# Patient Record
Sex: MALE | Race: WHITE | NOT HISPANIC OR LATINO | Employment: OTHER | URBAN - NONMETROPOLITAN AREA
[De-identification: names, ages, dates, MRNs, and addresses within clinical notes are randomized per-mention and may not be internally consistent; named-entity substitution may affect disease eponyms.]

---

## 2018-03-30 ENCOUNTER — OFFICE VISIT (OUTPATIENT)
Dept: FAMILY MEDICINE CLINIC | Facility: CLINIC | Age: 63
End: 2018-03-30

## 2018-03-30 VITALS
SYSTOLIC BLOOD PRESSURE: 190 MMHG | HEIGHT: 72 IN | DIASTOLIC BLOOD PRESSURE: 89 MMHG | WEIGHT: 208 LBS | OXYGEN SATURATION: 97 % | BODY MASS INDEX: 28.17 KG/M2 | HEART RATE: 100 BPM

## 2018-03-30 DIAGNOSIS — L91.8 SKIN TAG: ICD-10-CM

## 2018-03-30 DIAGNOSIS — Z13.220 ENCOUNTER FOR LIPID SCREENING FOR CARDIOVASCULAR DISEASE: ICD-10-CM

## 2018-03-30 DIAGNOSIS — Z12.5 ENCOUNTER FOR SCREENING FOR MALIGNANT NEOPLASM OF PROSTATE: ICD-10-CM

## 2018-03-30 DIAGNOSIS — Z12.5 ENCOUNTER FOR SPECIAL SCREENING EXAMINATION FOR NEOPLASM OF PROSTATE: ICD-10-CM

## 2018-03-30 DIAGNOSIS — M54.2 CERVICALGIA: Primary | ICD-10-CM

## 2018-03-30 DIAGNOSIS — Z13.6 ENCOUNTER FOR LIPID SCREENING FOR CARDIOVASCULAR DISEASE: ICD-10-CM

## 2018-03-30 PROCEDURE — 99204 OFFICE O/P NEW MOD 45 MIN: CPT | Performed by: FAMILY MEDICINE

## 2018-03-30 RX ORDER — LISINOPRIL 20 MG/1
20 TABLET ORAL DAILY
Refills: 2 | COMMUNITY
Start: 2018-02-27

## 2018-03-30 RX ORDER — HYDROCHLOROTHIAZIDE 12.5 MG/1
12.5 TABLET ORAL DAILY
Refills: 2 | COMMUNITY
Start: 2018-02-27 | End: 2018-04-27 | Stop reason: DRUGHIGH

## 2018-03-30 RX ORDER — CHLORAL HYDRATE 500 MG
1000 CAPSULE ORAL
Status: ON HOLD | COMMUNITY
End: 2021-12-15

## 2018-03-30 RX ORDER — MELATONIN
1000 DAILY
COMMUNITY

## 2018-04-02 ENCOUNTER — HOSPITAL ENCOUNTER (OUTPATIENT)
Dept: GENERAL RADIOLOGY | Facility: HOSPITAL | Age: 63
Discharge: HOME | End: 2018-04-02
Admitting: FAMILY MEDICINE

## 2018-04-02 PROCEDURE — 72040 X-RAY EXAM NECK SPINE 2-3 VW: CPT

## 2018-04-02 PROCEDURE — 72040 X-RAY EXAM NECK SPINE 2-3 VW: CPT | Performed by: RADIOLOGY

## 2018-04-03 LAB
ALBUMIN SERPL-MCNC: 3.9 G/DL (ref 3.4–4.8)
ALBUMIN/GLOB SERPL: 1.3 G/DL (ref 1.5–2.5)
ALP SERPL-CCNC: 72 U/L (ref 40–129)
ALT SERPL W P-5'-P-CCNC: 22 U/L (ref 10–44)
ANION GAP SERPL CALCULATED.3IONS-SCNC: 4.2 MMOL/L (ref 3.6–11.2)
AST SERPL-CCNC: 24 U/L (ref 10–34)
BASOPHILS # BLD AUTO: 0.02 10*3/MM3 (ref 0–0.3)
BASOPHILS NFR BLD AUTO: 0.3 % (ref 0–2)
BILIRUB SERPL-MCNC: 0.6 MG/DL (ref 0.2–1.8)
BUN BLD-MCNC: 15 MG/DL (ref 7–21)
BUN/CREAT SERPL: 17.4 (ref 7–25)
CALCIUM SPEC-SCNC: 9 MG/DL (ref 7.7–10)
CHLORIDE SERPL-SCNC: 113 MMOL/L (ref 99–112)
CHOLEST SERPL-MCNC: 169 MG/DL (ref 0–200)
CO2 SERPL-SCNC: 25.8 MMOL/L (ref 24.3–31.9)
CREAT BLD-MCNC: 0.86 MG/DL (ref 0.43–1.29)
DEPRECATED RDW RBC AUTO: 46 FL (ref 37–54)
EOSINOPHIL # BLD AUTO: 0.12 10*3/MM3 (ref 0–0.7)
EOSINOPHIL NFR BLD AUTO: 2 % (ref 0–5)
ERYTHROCYTE [DISTWIDTH] IN BLOOD BY AUTOMATED COUNT: 12.7 % (ref 11.5–14.5)
GFR SERPL CREATININE-BSD FRML MDRD: 90 ML/MIN/1.73
GLOBULIN UR ELPH-MCNC: 3.1 GM/DL
GLUCOSE BLD-MCNC: 85 MG/DL (ref 70–110)
HCT VFR BLD AUTO: 48.7 % (ref 42–52)
HDLC SERPL-MCNC: 42 MG/DL (ref 60–100)
HGB BLD-MCNC: 17.1 G/DL (ref 14–18)
IMM GRANULOCYTES # BLD: 0.02 10*3/MM3 (ref 0–0.03)
IMM GRANULOCYTES NFR BLD: 0.3 % (ref 0–0.5)
LDLC SERPL CALC-MCNC: 96 MG/DL (ref 0–100)
LDLC/HDLC SERPL: 2.29 {RATIO}
LYMPHOCYTES # BLD AUTO: 2.02 10*3/MM3 (ref 1–3)
LYMPHOCYTES NFR BLD AUTO: 33.9 % (ref 21–51)
MCH RBC QN AUTO: 34.8 PG (ref 27–33)
MCHC RBC AUTO-ENTMCNC: 35.1 G/DL (ref 33–37)
MCV RBC AUTO: 99.2 FL (ref 80–94)
MONOCYTES # BLD AUTO: 0.39 10*3/MM3 (ref 0.1–0.9)
MONOCYTES NFR BLD AUTO: 6.5 % (ref 0–10)
NEUTROPHILS # BLD AUTO: 3.39 10*3/MM3 (ref 1.4–6.5)
NEUTROPHILS NFR BLD AUTO: 57 % (ref 30–70)
OSMOLALITY SERPL CALC.SUM OF ELEC: 285.1 MOSM/KG (ref 273–305)
PLATELET # BLD AUTO: 201 10*3/MM3 (ref 130–400)
PMV BLD AUTO: 12.2 FL (ref 6–10)
POTASSIUM BLD-SCNC: 3.5 MMOL/L (ref 3.5–5.3)
PROT SERPL-MCNC: 7 G/DL (ref 6–8)
PSA SERPL-MCNC: 3.54 NG/ML (ref 0–4)
RBC # BLD AUTO: 4.91 10*6/MM3 (ref 4.7–6.1)
SODIUM BLD-SCNC: 143 MMOL/L (ref 135–153)
TRIGL SERPL-MCNC: 155 MG/DL (ref 0–150)
VLDLC SERPL-MCNC: 31 MG/DL
WBC NRBC COR # BLD: 5.96 10*3/MM3 (ref 4.5–12.5)

## 2018-04-03 PROCEDURE — 84153 ASSAY OF PSA TOTAL: CPT | Performed by: FAMILY MEDICINE

## 2018-04-03 PROCEDURE — 36415 COLL VENOUS BLD VENIPUNCTURE: CPT | Performed by: FAMILY MEDICINE

## 2018-04-03 PROCEDURE — 85025 COMPLETE CBC W/AUTO DIFF WBC: CPT | Performed by: FAMILY MEDICINE

## 2018-04-03 PROCEDURE — 80053 COMPREHEN METABOLIC PANEL: CPT | Performed by: FAMILY MEDICINE

## 2018-04-03 PROCEDURE — 80061 LIPID PANEL: CPT | Performed by: FAMILY MEDICINE

## 2018-04-04 ENCOUNTER — TELEPHONE (OUTPATIENT)
Dept: FAMILY MEDICINE CLINIC | Facility: CLINIC | Age: 63
End: 2018-04-04

## 2018-04-04 DIAGNOSIS — M50.90 CERVICAL NECK PAIN WITH EVIDENCE OF DISC DISEASE: Primary | ICD-10-CM

## 2018-04-04 NOTE — TELEPHONE ENCOUNTER
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 This also limits the movement ability he has with his neck. I would recommend physical therapy to see if we can make the muscles around the bones stronger. We can also wait and discuss the xray at his next appointment later this month. Thanks.    Patient notified & wishes to be referred to PT.

## 2018-04-04 NOTE — TELEPHONE ENCOUNTER
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 This also limits the movement ability he has with his neck. I would recommend physical therapy to see if we can make the muscles around the bones stronger. We can also wait and discuss the xray at his next appointment later this month. Thanks.

## 2018-04-04 NOTE — TELEPHONE ENCOUNTER
----- Message from Barb Gunderson MD sent at 4/4/2018  6:22 AM EDT -----  Labs stable. Okay to either call or send letter to patient. Thanks.      Spoke with patient & he verbalized understanding,wanted to know about his X-Rays?

## 2018-04-13 ENCOUNTER — HOSPITAL ENCOUNTER (OUTPATIENT)
Dept: PHYSICAL THERAPY | Facility: HOSPITAL | Age: 63
Setting detail: THERAPIES SERIES
Discharge: HOME | End: 2018-04-13

## 2018-04-13 DIAGNOSIS — M54.2 CERVICAL PAIN: Primary | ICD-10-CM

## 2018-04-13 PROCEDURE — G8978 MOBILITY CURRENT STATUS: HCPCS

## 2018-04-13 PROCEDURE — G8979 MOBILITY GOAL STATUS: HCPCS

## 2018-04-13 PROCEDURE — 97161 PT EVAL LOW COMPLEX 20 MIN: CPT | Performed by: PHYSICAL THERAPIST

## 2018-04-13 NOTE — THERAPY EVALUATION
"    Outpatient Physical Therapy Ortho Initial Evaluation   [facility]     Patient Name: David Frias  : 1955  MRN: [id number]  Today's Date: 2018      Visit Date: 2018    Patient Active Problem List   Diagnosis   • Abdominal pain   • Choledocholithiasis with obstruction        Past Medical History:   Diagnosis Date   • Hypertension     but refuses to take medication as he had a drop in blood pressure once        Past Surgical History:   Procedure Laterality Date   • CHOLECYSTECTOMY     • CHOLECYSTECTOMY WITH INTRAOPERATIVE CHOLANGIOGRAM N/A 8/3/2016    Procedure: CHOLECYSTECTOMY LAPAROSCOPIC INTRAOPERATIVE CHOLANGIOGRAM;  Surgeon: Garrett Barnes MD;  Location: [facility];  Service:    • FRACTURE SURGERY      ORIF RIGHT LEG    • HAND SURGERY Right     SECOND DIGIT AT FIRST AND SECOND JOINT SURGICAL REPAIR RELATED TO INJURY   • LEG SURGERY         Visit Dx:     ICD-10-CM ICD-9-CM   1. Cervical pain M54.2 723.1             Patient History     Row Name 18 0900             History    Chief Complaint Pain  -AD      Type of Pain Neck pain  -AD      Date Current Problem(s) Began --   Summer 2017  -      Brief Description of Current Complaint The patient reported \"flipping my riding lawnmower\" the summer of 2017. He stated when the lawnmower flipped the steering wheel hit him at about eye/nose level, resulting in his head slamming against the ground. He stated he felt \"weird after\" and reported to an MD which did not perform imaging. He stated he began seeing a new MD approximately a week ago, with an X-ray performed. He stated the x-ray revealed no fractures. He stated approximately two and a half months ago he moved \"something heavy\" and experienced a sharp pain in the back of his head with \"numbnes of both arms\". He stated no imaging was performed following the incident. The patient stated the pain is intermittent, with shooting pain resulting with rotation. He reported hot showers have helped " "with pain relief. The patient stated he experiences a \"pressure\" around his head, but not as intense as a headache. He stated he does not experience dizziness or nausea as a result of head and neck pain.  -AD      Current Tobacco Use None  -AD      Smoking Status Former smoker  -AD      Patient's Rating of General Health Good  -AD      Hand Dominance right-handed  -AD         Pain     Pain Location Neck  -AD      Pain at Present 2  -AD      Pain at Best 0  -AD      Pain at Worst 10  -AD      Pain Frequency Intermittent  -AD      Pain Description Aching;Pressure  -AD      What Performance Factors Make the Current Problem(s) WORSE? Quick, frequent movements.  -AD      What Performance Factors Make the Current Problem(s) BETTER? Hot showers  -AD      Is your sleep disturbed? Yes  -AD      Is medication used to assist with sleep? No  -AD      Difficulties with ADL's? Pain with daily activities, requires extra time.  -AD         Fall Risk Assessment    Any falls in the past year: No  -AD         Daily Activities    Primary Language English  -AD      How does patient learn best? Listening;Demonstration  -AD      Pt Participated in POC and Goals Yes  -AD         Safety    Are you being hurt, hit, or frightened by anyone at home or in your life? No  -AD      Are you being neglected by a caregiver No  -AD        User Key  (r) = Recorded By, (t) = Taken By, (c) = Cosigned By    Initials Name Provider Type    AD Ashley Claudene Dalton, PT Physical Therapist                PT Ortho     Row Name 04/13/18 1000       Subjective Pain    Able to rate subjective pain? yes  -AD    Pre-Treatment Pain Level 2  -AD    Post-Treatment Pain Level 2  -AD       Posture/Observations    Posture- WNL --   Forward head  -AD       DTR- Upper Quarter Clearing    Biceps (C5/6) Bilateral:;2- Normal response  -AD    Brachioradialis (C6) Bilateral:;2- Normal response  -AD    Triceps (C7) Bilateral:;2- Normal response  -AD       Neural Tension Signs- " Upper Quarter Clearing    ULNTT 1 Bilateral:;Negative  -AD    ULNTT 3 Bilateral:;Negative  -AD    ULNTT 4 Bilateral:;Negative  -AD       Sensory Screen for Light Touch- Upper Quarter Clearing    C4 (posterior shoulder) Bilateral:;Intact  -AD    C5 (lateral upper arm) Bilateral:;Intact  -AD    C6 (tip of thumb) Bilateral:;Intact  -AD    C7 (tip of 3rd finger) Bilateral:;Intact  -AD    C8 (tip of 5th finger) Bilateral:;Intact  -AD    T1 (medial lower arm) Bilateral:;Intact  -AD       Myotomal Screen- Upper Quarter Clearing    Shoulder flexion (C5) Bilateral:;5 (Normal)  -AD    Elbow flexion/wrist extension (C6) Bilateral:;5 (Normal)  -AD    Elbow extension/wrist flexion (C7) Bilateral:;5 (Normal)  -AD       Cervical/Shoulder ROM Screen    Cervical flexion Impaired   30  -AD    Cervical extension Impaired   32  -AD    Cervical lateral flexion Impaired   Right: 10; Left: 8  -AD    Cervical rotation Impaired   Right: 44; Left: 42  -AD       Cervical Palpation    Cervical Palpation- Location? --   C4 spinous process and right TP tenderness.  -AD       Cervical/Thoracic Special Tests    Spurlings (Foraminal Compression) Bilateral:;Negative  -AD    Cervical Compression (Forarminal Compression vs. Facet Pain) Negative  -AD    Cervical Distraction (Foraminal Compression vs. Facet Pain) Negative  -AD    Transverse Ligament (Instability) Negative  -AD        Strength Right    # Reps 3  -AD    Right Rung 2  -AD    Right  Test 1 100  -AD    Right  Test 2 100  -AD    Right  Test 3 100  -AD     Strength Average Right 100  -AD        Strength Left    # Reps 3  -AD    Left Rung 2  -AD    Left  Test 1 95  -AD    Left  Test 2 110  -AD    Left  Test 3 110  -AD     Strength Average Left 105  -AD       Sensation    Sensation WNL? WNL  -AD      User Key  (r) = Recorded By, (t) = Taken By, (c) = Cosigned By    Initials Name Provider Type    AD Ashley Claudene Dalton, PT Physical Therapist                       Therapy Education  Given: HEP, Pain management, Posture/body mechanics  Program: New  How Provided: Verbal, Demonstration  Provided to: Patient  Level of Understanding: Teach back education performed, Verbalized, Demonstrated           PT OP Goals     Row Name 04/13/18 1000          PT Short Term Goals    STG Date to Achieve 04/27/18  -AD     STG 1 Pt will be instructed in HEP for improved independence.  -AD     STG 1 Progress New  -AD     STG 2 Pt will demonstrate a 5 degree improvement in CROM for improved mobility and function.  -AD     STG 2 Progress New  -AD     STG 3 Pt will report a maximum of 7/10 cervical pain with daily activities.  -AD     STG 3 Progress New  -AD        Long Term Goals    LTG Date to Achieve 05/13/18  -AD     LTG 1 Pt will demonstrate CROM within normal limits for improved mobility and function.  -AD     LTG 1 Progress New  -AD     LTG 2 Pt will report a maximum of 4/10 cervical pain with daily activities.  -AD     LTG 2 Progress New  -AD     LTG 3 Pt will report less than 20% impairment on the NDI for improved functional independence.  -AD     LTG 3 Progress New  -AD     LTG 4 Pt will report the ability to sleep with no interruptions due to cervical pain for improved QOL.  -AD     LTG 4 Progress New  -AD        Time Calculation    PT Goal Re-Cert Due Date 05/13/18  -AD       User Key  (r) = Recorded By, (t) = Taken By, (c) = Cosigned By    Initials Name Provider Type    AD Ashley Claudene Dalton, PT Physical Therapist                PT Assessment/Plan     Row Name 04/13/18 1104          PT Assessment    Functional Limitations Limitation in home management;Limitations in community activities;Performance in sport activities;Performance in self-care ADL;Performance in leisure activities  -AD     Impairments Range of motion;Muscle strength;Pain;Joint mobility;Joint integrity;Posture  -AD     Assessment Comments The patient is a 62 year old male presenting to the clinic with  cervical pain. He demonstrated impaired range of motion and hypomobility of C4-C7 spinous processes. In addition, he was tender to palpation of C4 spinous and transverse processes. Special tests for neural involvement were negative, with finding revealing hypomobility and muscle guarding as likely contributors to pain. He would benefit from skilled physical therapy to address functional limitations and impairments. The patient will be progressed as tolerated.   -AD     Please refer to paper survey for additional self-reported information Yes  -AD     Rehab Potential Good  -AD     Patient/caregiver participated in establishment of treatment plan and goals Yes  -AD     Patient would benefit from skilled therapy intervention Yes  -AD        PT Plan    PT Frequency 2x/week   4 weeks  -AD     Planned CPT's? PT EVAL MOD COMPLELITY: 38151;PT RE-EVAL: 78254;PT NEUROMUSC RE-EDUCATION EA 15 MIN: 71093;PT MANUAL THERAPY EA 15 MIN: 86998;PT THER ACT EA 15 MIN: 54671;PT THER PROC EA 15 MIN: 16108;PT SELF CARE/HOME MGMT/TRAIN EA 15: 62256;PT ULTRASOUND EA 15 MIN: 17186;PT ELECTRICAL STIM ATTD EA 15 MIN: 06655;PT ELECTRICAL STIM UNATTEND: ;PT THER SUPP EA 15 MIN;PT HOT/COLD PACK WC NONMCARE: 04162;PT TRACTION CERVICAL: 64313  -AD     PT Plan Comments Progress as tolerated per POC.  -AD       User Key  (r) = Recorded By, (t) = Taken By, (c) = Cosigned By    Initials Name Provider Type    AD Ashley Claudene Dalton, PT Physical Therapist                  Exercises     Row Name 04/13/18 1000             Subjective Pain    Able to rate subjective pain? yes  -AD      Pre-Treatment Pain Level 2  -AD      Post-Treatment Pain Level 2  -AD         Exercise 1    Exercise Name 1 HEP: Scapular squeeze, cervical retraction, UT stretch, levator stretch  -AD      Cueing 1 Verbal;Tactile;Demo  -AD        User Key  (r) = Recorded By, (t) = Taken By, (c) = Cosigned By    Initials Name Provider Type    AD Ashley Claudene Dalton, PT Physical  Therapist                        Outcome Measure Options: Neck Disability Index (NDI)  Neck Disability Index  Section 1 - Pain Intensity: The pain is very mild at the moment.  Section 2 - Personal Care: I can look after myself normally without causing extra pain.  Section 3 - Lifting: Pain prevents me from lifting heavy weights off the floor but I can manage if items are conveniently positioned, ie. on a table.  Section 4 - Work: I can't do my usual work.  Section 5 - Headaches: I have slight headaches that come infrequently.  Section 6 - Concentration: I can concentrate fully without difficulty.  Section 7 - Sleeping: My sleep is mildly disturbed for up to 1-2 hours.  Section 8 - Driving: I can drive as long as I want with slight neck pain.  Section 9 - Reading: I can't read at all.  Section 10 - Recreation: I have some neck pain with a few recreational activities.  Neck Disability Index Score: 17  Neck Disability Index Comments: 17/50 = 34% impairment      Time Calculation:   Start Time: 0956  Stop Time: 1100  Time Calculation (min): 64 min     Therapy Charges for Today     Code Description Service Date Service Provider Modifiers Qty    18505489064 HC PT EVAL LOW COMPLEXITY 4 4/13/2018 Ashley Claudene Dalton, PT GP 1          PT G-Codes  Outcome Measure Options: Neck Disability Index (NDI)         Ashley Claudene Dalton, PT  4/13/2018

## 2018-04-13 NOTE — PROGRESS NOTES
"David Frias     VITALS: Blood pressure (!) 190/89, pulse 100, height 182.9 cm (72.01\"), weight 94.3 kg (208 lb), SpO2 97 %.    Subjective  Chief Complaint:   Chief Complaint   Patient presents with   • Neck Pain   • Headache        History of Present Illness:  Patient is a 62 y.o.  male with a medical history significant for hypertension and neck pain who presents to clinic secondary to establishment of care.He complains today of neck pain and a headache.    Patient has hypertension and is on HCTZ 12.5 mg daily, Lisinopril 20 mg daily and hydralazine 25 mg orally TID. Denies any side effects of the medications. Denies any dizziness, lightheadedness, blurry vision, chest pain, or edema.   Home blood pressure: No  Low salt diet: No    The following portions of the patient's history were reviewed and updated as appropriate: allergies, current medications, past family history, past medical history, past social history, past surgical history and problem list.    Past Medical History  Past Medical History:   Diagnosis Date   • Hypertension     but refuses to take medication as he had a drop in blood pressure once       Review of Systems  Constitutional: Denies any recent history of  fevers, chills, itching.  Eyes: Denies any changes in vision. Denies any blurry vision or diplopia.  Ears, Nose, Mouth, Throat: Denies any sore throat, rhinorrhea, or cough.  Cardiovascular: Denies any chest pain, pressure, or palpitations.  Respiratory: Denies any shortness of breath or wheezing.  Gastrointestinal: Denies any abdominal pain, nausea, vomiting, diarrhea, or constipation.  Genitourinary: Denies any changes in urination.  Skin and/or breasts: Denies any rashes.  Neurological: Denies any changes in balance or gait.  Psychiatric: Denies any anxiety, depression, or insomnia. Denies any suicidal or homicidal ideations.  Endocrine: Denies any heat or cold intolerance. Denies any voice changes, polydipsia, or " polyuria.  Hematologic/Lymphatic: Denies any anemia or easy bruising.    Surgical History  Past Surgical History:   Procedure Laterality Date   • CHOLECYSTECTOMY     • CHOLECYSTECTOMY WITH INTRAOPERATIVE CHOLANGIOGRAM N/A 8/3/2016    Procedure: CHOLECYSTECTOMY LAPAROSCOPIC INTRAOPERATIVE CHOLANGIOGRAM;  Surgeon: Garrett Barnes MD;  Location: [facility];  Service:    • FRACTURE SURGERY  2015    ORIF RIGHT LEG    • HAND SURGERY Right     SECOND DIGIT AT FIRST AND SECOND JOINT SURGICAL REPAIR RELATED TO INJURY   • LEG SURGERY         Family History  Family History   Problem Relation Age of Onset   • Heart disease Paternal Grandmother    • Hypertension Sister    • Cancer Sister    • Depression Brother        Social History  Social History     Social History   • Marital status:      Spouse name: N/A   • Number of children: N/A   • Years of education: N/A     Occupational History   • Not on file.     Social History Main Topics   • Smoking status: Former Smoker     Packs/day: 1.00     Years: 15.00     Types: Cigarettes   • Smokeless tobacco: Never Used      Comment: STOPPED SMOKING 10-12 YEARS AGO   • Alcohol use No   • Drug use: No   • Sexual activity: Defer     Other Topics Concern   • Not on file     Social History Narrative   • No narrative on file       Objective  Physical Exam  Gen: Patient in NAD. Pleasant and answers appropriately. A&Ox3.    Skin: Warm and dry with normal turgor. No purpura, rashes, or unusual pigmentation noted. Hair is normal in appearance and distribution.    HEENT: NC/AT. No lesions noted. Conjunctiva clear, sclera nonicteric. PERRL. EOMI without nystagmus or strabismus. Fundi appear benign. No hemorrhages or exudates of eyes. Auditory canals are patent bilaterally without lesions. TMs intact, nonerythematous, nonbulging without lesions. Nasal mucosa pink, nonerythematous, and nonedematous. No nasal polyps or other lesions noted. Frontal and maxillary sinuses are nontender. Lips  acyanotic without pallor. Teeth appear to be in good condition. +/- dental caries. There are no lesions or tremor of the tongue. O/P nonerythematous and moist without exudate.    Neck: Supple without lymph nodes palpated. FROM. No evidence of tracheal deviation or thyromegaly. Carotid pulses 2+/4 B/L without bruits. J    Lungs: CTA B/L without rales, rhonchi, crackles, or wheezes.    Heart: RRR. S1 and S2 normal. No S3 or S4. No MRGT.    Abd: Soft, nontender,nondistended. (+)BSx4 quadrants. No scars or abnormalities noted. No HSM, masses, or bruits noted.    Extrem: No CCE. Radial and dorsalis pedis pulses 2+/4 and equal B/L. FROMx4. No bone, joint, or muscle tenderness noted.    Neuro: CNs II-XII grossly intact. Speech, memory, and expression WNL. Muscle strength 5/5. DTRs 2+/4 and equal in both UE and LE B/L. No muscle atrophy or involuntary movement noted. Cerebellar function is intact. No focal motor/sensory deficits.    Procedures    Assessment/Plan  David Frias is a 62 y.o. here for establishment of care.  Diagnoses and all orders for this visit:    Cervicalgia  -     XR Spine Cervical 2 View  -     Comprehensive Metabolic Panel  -     CBC & Differential  -     CBC Auto Differential  -     Osmolality, Calculated; Future  -     Osmolality, Calculated    Encounter for lipid screening for cardiovascular disease  -     Lipid Panel    Encounter for special screening examination for neoplasm of prostate    Encounter for screening for malignant neoplasm of prostate  -     PSA DIAGNOSTIC    Skin tag  -     Ambulatory Referral to Ophthalmology      Findings and plans discussed with patient who verbalizes understanding and agreement. Will followup with patient once results are in. Patient to followup at clinic PRN or in one month for medical followup.      Barb Gunderson MD

## 2018-04-17 ENCOUNTER — HOSPITAL ENCOUNTER (OUTPATIENT)
Dept: PHYSICAL THERAPY | Facility: HOSPITAL | Age: 63
Setting detail: THERAPIES SERIES
Discharge: HOME | End: 2018-04-17

## 2018-04-17 DIAGNOSIS — M54.2 CERVICAL PAIN: Primary | ICD-10-CM

## 2018-04-17 PROCEDURE — 97140 MANUAL THERAPY 1/> REGIONS: CPT

## 2018-04-17 PROCEDURE — 97110 THERAPEUTIC EXERCISES: CPT

## 2018-04-17 NOTE — THERAPY TREATMENT NOTE
Outpatient Physical Therapy Ortho Treatment Note   [place]     Patient Name: David Frias  : 1955  MRN: [id number]  Today's Date: 2018      Visit Date: 2018    Visit Dx:    ICD-10-CM ICD-9-CM   1. Cervical pain M54.2 723.1       Patient Active Problem List   Diagnosis   • Abdominal pain   • Choledocholithiasis with obstruction        Past Medical History:   Diagnosis Date   • Hypertension     but refuses to take medication as he had a drop in blood pressure once        Past Surgical History:   Procedure Laterality Date   • CHOLECYSTECTOMY     • CHOLECYSTECTOMY WITH INTRAOPERATIVE CHOLANGIOGRAM N/A 8/3/2016    Procedure: CHOLECYSTECTOMY LAPAROSCOPIC INTRAOPERATIVE CHOLANGIOGRAM;  Surgeon: Garrett Barnes MD;  [facility];  Service:    • FRACTURE SURGERY      ORIF RIGHT LEG    • HAND SURGERY Right     SECOND DIGIT AT FIRST AND SECOND JOINT SURGICAL REPAIR RELATED TO INJURY   • LEG SURGERY                               PT Assessment/Plan     Row Name 18 1108          PT Assessment    Assessment Comments Tx today consisted of of mh and estim to cervical region followed by ther ex; stm and joint mobs.  Pt responded well with cues for cervical retractions and holding stretches.  Pt reported decreased pain following session.  -RT        PT Plan    PT Plan Comments Will follow progressing as pt tolerates.  -RT       User Key  (r) = Recorded By, (t) = Taken By, (c) = Cosigned By    Initials Name Provider Type    RT Gary Starr, PT Physical Therapist                Modalities     Row Name 18 0900             Subjective Comments    Subjective Comments Pt reports having increased cervical tightness.  -RT         Subjective Pain    Able to rate subjective pain? yes  -RT      Pre-Treatment Pain Level 3  -RT      Post-Treatment Pain Level 1  -RT         Moist Heat    MH Applied Yes  -RT      Location cervical region  -RT      Rx Minutes 10 mins  -RT         ELECTRICAL STIMULATION     Attended/Unattended Unattended  -RT      Stimulation Type IFC  -RT      Location/Electrode Placement/Other cervical region  -RT      Rx Minutes 10 mins  -RT        User Key  (r) = Recorded By, (t) = Taken By, (c) = Cosigned By    Initials Name Provider Type    RT Gary Starr PT Physical Therapist                Exercises     Row Name 04/17/18 0900             Subjective Comments    Subjective Comments Pt reports having increased cervical tightness.  -RT         Subjective Pain    Able to rate subjective pain? yes  -RT      Pre-Treatment Pain Level 3  -RT      Post-Treatment Pain Level 1  -RT         Exercise 1    Exercise Name 1 cervical retraction; crom 15, scap squeezes, ut stretch, lev scap stretch  -RT      Cueing 1 Verbal;Tactile;Demo  -RT      Time 1 20min   -RT        User Key  (r) = Recorded By, (t) = Taken By, (c) = Cosigned By    Initials Name Provider Type    RT Gary Starr PT Physical Therapist                        Manual Rx (last 36 hours)      Manual Treatments     Row Name 04/17/18 1100             Manual Rx 1    Manual Rx 1 Location cervical and bilat UT  -RT      Manual Rx 1 Type stm and PA and lateral glides to c-spine  -RT      Manual Rx 1 Grade 2  -RT      Manual Rx 1 Duration 20  -RT        User Key  (r) = Recorded By, (t) = Taken By, (c) = Cosigned By    Initials Name Provider Type    RT Gary Starr PT Physical Therapist              Therapy Education  Given: HEP, Pain management, Posture/body mechanics  Program: New  How Provided: Verbal, Demonstration  Provided to: Patient  Level of Understanding: Teach back education performed, Verbalized, Demonstrated              Time Calculation:   Start Time: 0900  Stop Time: 0958  Time Calculation (min): 58 min    Therapy Charges for Today     Code Description Service Date Service Provider Modifiers Qty    65138512912  PT THER PROC EA 15 MIN 4/17/2018 Gary Starr, PT GP 1    [id number]  PT MANUAL THERAPY EA 15 MIN 4/17/2018  Gary Starr, PT GP 2                    Gary Starr, PT  4/17/2018

## 2018-04-19 ENCOUNTER — HOSPITAL ENCOUNTER (OUTPATIENT)
Dept: PHYSICAL THERAPY | Facility: HOSPITAL | Age: 63
Setting detail: THERAPIES SERIES
Discharge: HOME | End: 2018-04-19

## 2018-04-19 DIAGNOSIS — M54.2 CERVICAL PAIN: Primary | ICD-10-CM

## 2018-04-19 PROCEDURE — G0283 ELEC STIM OTHER THAN WOUND: HCPCS

## 2018-04-19 PROCEDURE — 97110 THERAPEUTIC EXERCISES: CPT

## 2018-04-19 PROCEDURE — 97140 MANUAL THERAPY 1/> REGIONS: CPT

## 2018-04-19 NOTE — THERAPY TREATMENT NOTE
Outpatient Physical Therapy Ortho Treatment Note   [place]     Patient Name: David Frias  : 1955  MRN: [id number]  Today's Date: 2018      Visit Date: 2018    Visit Dx:    ICD-10-CM ICD-9-CM   1. Cervical pain M54.2 723.1       Patient Active Problem List   Diagnosis   • Abdominal pain   • Choledocholithiasis with obstruction        Past Medical History:   Diagnosis Date   • Hypertension     but refuses to take medication as he had a drop in blood pressure once        Past Surgical History:   Procedure Laterality Date   • CHOLECYSTECTOMY     • CHOLECYSTECTOMY WITH INTRAOPERATIVE CHOLANGIOGRAM N/A 8/3/2016    Procedure: CHOLECYSTECTOMY LAPAROSCOPIC INTRAOPERATIVE CHOLANGIOGRAM;  Surgeon: Garrett Barnes MD;  Location: [facility];  Service:    • FRACTURE SURGERY      ORIF RIGHT LEG    • HAND SURGERY Right     SECOND DIGIT AT FIRST AND SECOND JOINT SURGICAL REPAIR RELATED TO INJURY   • LEG SURGERY                               PT Assessment/Plan     Row Name 18 1005          PT Assessment    Assessment Comments Tx today consisted of mh and estim to neck followed by ther ex and ended with stm.  Pt responded well to added mid rows with tb and pt reported no pain following session.  -RT        PT Plan    PT Plan Comments Will follow progressinng mobility and decreased pain.  -RT       User Key  (r) = Recorded By, (t) = Taken By, (c) = Cosigned By    Initials Name Provider Type    RT Gary Starr, PT Physical Therapist                    Exercises     Row Name 18 1000             Subjective Comments    Subjective Comments Pt reports therapy has helped with his neck pain.  -RT         Subjective Pain    Able to rate subjective pain? yes  -RT      Pre-Treatment Pain Level 3  -RT      Post-Treatment Pain Level 0  -RT         Exercise 1    Exercise Name 1 cervical retraction; crom 15, mid rows rtb 15, ut stretch, lev scap stretch  -RT      Cueing 1 Verbal;Tactile;Demo  -RT      Time 1  20min  -RT        User Key  (r) = Recorded By, (t) = Taken By, (c) = Cosigned By    Initials Name Provider Type    RT Gary Starr PT Physical Therapist                        Manual Rx (last 36 hours)      Manual Treatments     Row Name 04/19/18 0900             Manual Rx 1    Manual Rx 1 Location cervical and bilat UT  -RT      Manual Rx 1 Type stm and PA and lateral glides to c-spine  -RT      Manual Rx 1 Grade 2  -RT      Manual Rx 1 Duration 25  -RT        User Key  (r) = Recorded By, (t) = Taken By, (c) = Cosigned By    Initials Name Provider Type    RT Gary Starr PT Physical Therapist              Therapy Education  Given: HEP, Pain management, Posture/body mechanics  Program: Reinforced  How Provided: Verbal, Demonstration  Provided to: Patient  Level of Understanding: Teach back education performed, Verbalized, Demonstrated              Time Calculation:   Start Time: 0850  Stop Time: 0955  Time Calculation (min): 65 min    Therapy Charges for Today     Code Description Service Date Service Provider Modifiers Qty    01254058310 HC PT THER PROC EA 15 MIN 4/19/2018 Gary Starr, PT GP 1    47413998520 HC PT MANUAL THERAPY EA 15 MIN 4/19/2018 Gary Starr, PT GP 2    18403027711 HC PT ELECTRICAL STIM UNATTENDED 4/19/2018 Gary Starr, PT  1                    Gary Starr, PT  4/19/2018

## 2018-04-24 ENCOUNTER — HOSPITAL ENCOUNTER (OUTPATIENT)
Dept: PHYSICAL THERAPY | Facility: HOSPITAL | Age: 63
Setting detail: THERAPIES SERIES
Discharge: HOME | End: 2018-04-24

## 2018-04-24 DIAGNOSIS — M54.2 CERVICAL PAIN: Primary | ICD-10-CM

## 2018-04-24 PROCEDURE — 97140 MANUAL THERAPY 1/> REGIONS: CPT | Performed by: PHYSICAL THERAPIST

## 2018-04-24 PROCEDURE — 97110 THERAPEUTIC EXERCISES: CPT | Performed by: PHYSICAL THERAPIST

## 2018-04-24 PROCEDURE — G0283 ELEC STIM OTHER THAN WOUND: HCPCS | Performed by: PHYSICAL THERAPIST

## 2018-04-24 NOTE — THERAPY TREATMENT NOTE
Outpatient Physical Therapy Ortho Treatment Note   [place]     Patient Name: David rFias  : 1955  MRN: [id number]  Today's Date: 2018      Visit Date: 2018    Visit Dx:    ICD-10-CM ICD-9-CM   1. Cervical pain M54.2 723.1       Patient Active Problem List   Diagnosis   • Abdominal pain   • Choledocholithiasis with obstruction        Past Medical History:   Diagnosis Date   • Hypertension     but refuses to take medication as he had a drop in blood pressure once        Past Surgical History:   Procedure Laterality Date   • CHOLECYSTECTOMY     • CHOLECYSTECTOMY WITH INTRAOPERATIVE CHOLANGIOGRAM N/A 8/3/2016    Procedure: CHOLECYSTECTOMY LAPAROSCOPIC INTRAOPERATIVE CHOLANGIOGRAM;  Surgeon: Garrett Barnes MD;  Location: [facility];  Service:    • FRACTURE SURGERY      ORIF RIGHT LEG    • HAND SURGERY Right     SECOND DIGIT AT FIRST AND SECOND JOINT SURGICAL REPAIR RELATED TO INJURY   • LEG SURGERY                               PT Assessment/Plan     Row Name 18 1514 18 1513       PT Assessment    Assessment Comments  -- Pt seen today for e-stim to cervical spine with mh followed by STM and stretching as well as gentle mobs to improve CROM.  He performed ther ex to improve CROM and strength as well.  He kavitha session well.   -AT       PT Plan    PT Plan Comments cont poc  -AT  --      User Key  (r) = Recorded By, (t) = Taken By, (c) = Cosigned By    Initials Name Provider Type    AT Crystal Starr, PT Physical Therapist                Modalities     Row Name 18 1500             Subjective Comments    Subjective Comments Pt arrives and reports continued disomfort in neck and states that he is going back to dr soon to have skin tag removed off of eye as well as further testing of neck.  He states therapy is helping.   -AT         Subjective Pain    Able to rate subjective pain? yes  -AT      Pre-Treatment Pain Level 3  -AT      Post-Treatment Pain Level 1  -AT          Moist Heat    MH Applied Yes  -AT      Location cervical region  -AT      Rx Minutes 10 mins  -AT         ELECTRICAL STIMULATION    Attended/Unattended Unattended  -AT      Stimulation Type IFC  -AT      Location/Electrode Placement/Other cervical region  -AT      Rx Minutes 10 mins  -AT        User Key  (r) = Recorded By, (t) = Taken By, (c) = Cosigned By    Initials Name Provider Type    AT Crystal Starr PT Physical Therapist                Exercises     Row Name 04/24/18 1500             Subjective Comments    Subjective Comments Pt arrives and reports continued disomfort in neck and states that he is going back to dr soon to have skin tag removed off of eye as well as further testing of neck.  He states therapy is helping.   -AT         Subjective Pain    Able to rate subjective pain? yes  -AT      Pre-Treatment Pain Level 3  -AT      Post-Treatment Pain Level 1  -AT         Exercise 1    Exercise Name 1 cervical retraction; crom 15, mid rows rtb 15, ut stretch, lev scap stretch  -AT      Cueing 1 Verbal;Tactile;Demo  -AT      Time 1 20min  -AT        User Key  (r) = Recorded By, (t) = Taken By, (c) = Cosigned By    Initials Name Provider Type    AT Crystal Starr PT Physical Therapist                        Manual Rx (last 36 hours)      Manual Treatments     Row Name 04/24/18 1500             Manual Rx 1    Manual Rx 1 Location cervical and bilat UT  -AT      Manual Rx 1 Type STM and PA glides to tolerance  -AT      Manual Rx 1 Duration 15  -AT        User Key  (r) = Recorded By, (t) = Taken By, (c) = Cosigned By    Initials Name Provider Type    AT Crystal Starr PT Physical Therapist                             Time Calculation:   Start Time: 0900  Stop Time: 0945  Time Calculation (min): 45 min    Therapy Charges for Today     Code Description Service Date Service Provider Modifiers Qty    88775411611  PT MANUAL THERAPY EA 15 MIN 4/24/2018 Crystal Starr PT GP 1     74145452462 HC PT THER PROC EA 15 MIN 4/24/2018 Crystal Starr, PT GP 1    16361768123 HC PT ELECTRICAL STIM UNATTENDED 4/24/2018 Crystal Starr, PT  1                    Crystal Starr, PT  4/24/2018

## 2018-04-26 ENCOUNTER — HOSPITAL ENCOUNTER (OUTPATIENT)
Dept: PHYSICAL THERAPY | Facility: HOSPITAL | Age: 63
Setting detail: THERAPIES SERIES
Discharge: HOME | End: 2018-04-26

## 2018-04-26 DIAGNOSIS — M54.2 CERVICAL PAIN: Primary | ICD-10-CM

## 2018-04-26 PROCEDURE — G0283 ELEC STIM OTHER THAN WOUND: HCPCS

## 2018-04-26 PROCEDURE — 97140 MANUAL THERAPY 1/> REGIONS: CPT

## 2018-04-26 PROCEDURE — 97110 THERAPEUTIC EXERCISES: CPT

## 2018-04-26 NOTE — THERAPY TREATMENT NOTE
Outpatient Physical Therapy Ortho Treatment Note   [place]     Patient Name: David Frias  : 1955  MRN: [id number]  Today's Date: 2018      Visit Date: 2018    Visit Dx:    ICD-10-CM ICD-9-CM   1. Cervical pain M54.2 723.1       Patient Active Problem List   Diagnosis   • Abdominal pain   • Choledocholithiasis with obstruction        Past Medical History:   Diagnosis Date   • Hypertension     but refuses to take medication as he had a drop in blood pressure once        Past Surgical History:   Procedure Laterality Date   • CHOLECYSTECTOMY     • CHOLECYSTECTOMY WITH INTRAOPERATIVE CHOLANGIOGRAM N/A 8/3/2016    Procedure: CHOLECYSTECTOMY LAPAROSCOPIC INTRAOPERATIVE CHOLANGIOGRAM;  Surgeon: Garrett Barnes MD;  Location: [facility];  Service:    • FRACTURE SURGERY      ORIF RIGHT LEG    • HAND SURGERY Right     SECOND DIGIT AT FIRST AND SECOND JOINT SURGICAL REPAIR RELATED TO INJURY   • LEG SURGERY                               PT Assessment/Plan     Row Name 18 1006          PT Assessment    Assessment Comments Tx today consisted of mh and estim to neck followed by stretching, stm, mobs and ended with exercises.  Pt responded well to tx today.  -RT        PT Plan    PT Plan Comments Will follow progressing with hep.  -RT       User Key  (r) = Recorded By, (t) = Taken By, (c) = Cosigned By    Initials Name Provider Type    RT Gary S Matty, PT Physical Therapist                Modalities     Row Name 18 0900             Subjective Comments    Subjective Comments Pt reports he will see his MD tomorrow.  -RT         Subjective Pain    Able to rate subjective pain? yes  -RT      Pre-Treatment Pain Level 3  -RT      Post-Treatment Pain Level 2  -RT         Moist Heat    MH Applied Yes  -RT      Location cervical region  -RT      Rx Minutes 10 mins  -RT         ELECTRICAL STIMULATION    Attended/Unattended Unattended  -RT      Stimulation Type IFC  -RT      Location/Electrode  Placement/Other cervical region  -RT      Rx Minutes 10 mins  -RT        User Key  (r) = Recorded By, (t) = Taken By, (c) = Cosigned By    Initials Name Provider Type    RT Gary Starr PT Physical Therapist                Exercises     Row Name 04/26/18 0900             Subjective Comments    Subjective Comments Pt reports he will see his MD tomorrow.  -RT         Subjective Pain    Able to rate subjective pain? yes  -RT      Pre-Treatment Pain Level 3  -RT      Post-Treatment Pain Level 2  -RT         Exercise 1    Exercise Name 1 cervical retraction; crom 15, mid rows rtb 15, ut stretch, lev scap stretch  -RT      Cueing 1 Verbal;Tactile;Demo  -RT      Time 1 20 min  -RT        User Key  (r) = Recorded By, (t) = Taken By, (c) = Cosigned By    Initials Name Provider Type    RT Gary Starr PT Physical Therapist                        Manual Rx (last 36 hours)      Manual Treatments     Row Name 04/26/18 0900             Manual Rx 1    Manual Rx 1 Location cervical and bilat UT  -RT      Manual Rx 1 Type STM and PA glides to tolerance  -RT      Manual Rx 1 Duration 20min  -RT        User Key  (r) = Recorded By, (t) = Taken By, (c) = Cosigned By    Initials Name Provider Type    RT Gary Starr PT Physical Therapist              Therapy Education  Given: HEP, Pain management, Posture/body mechanics  Program: Reinforced  How Provided: Verbal, Demonstration  Provided to: Patient  Level of Understanding: Teach back education performed, Verbalized, Demonstrated              Time Calculation:   Start Time: 0850  Stop Time: 0940  Time Calculation (min): 50 min    Therapy Charges for Today     Code Description Service Date Service Provider Modifiers Qty    37400815176 HC PT THER PROC EA 15 MIN 4/26/2018 Gary Starr, PT GP 1    18261759496 HC PT MANUAL THERAPY EA 15 MIN 4/26/2018 Gary Starr, PT GP 2    99568915171 HC PT ELECTRICAL STIM UNATTENDED 4/26/2018 Gary Starr, PT  1                    Gary  MEMO Starr, PT  4/26/2018

## 2018-04-27 ENCOUNTER — OFFICE VISIT (OUTPATIENT)
Dept: FAMILY MEDICINE CLINIC | Facility: CLINIC | Age: 63
End: 2018-04-27

## 2018-04-27 VITALS
DIASTOLIC BLOOD PRESSURE: 90 MMHG | WEIGHT: 204 LBS | HEIGHT: 72 IN | OXYGEN SATURATION: 97 % | BODY MASS INDEX: 27.63 KG/M2 | SYSTOLIC BLOOD PRESSURE: 140 MMHG | HEART RATE: 89 BPM

## 2018-04-27 DIAGNOSIS — M50.90 CERVICAL NECK PAIN WITH EVIDENCE OF DISC DISEASE: ICD-10-CM

## 2018-04-27 DIAGNOSIS — I10 ESSENTIAL HYPERTENSION: Primary | ICD-10-CM

## 2018-04-27 PROCEDURE — 99214 OFFICE O/P EST MOD 30 MIN: CPT | Performed by: FAMILY MEDICINE

## 2018-04-27 RX ORDER — HYDROCHLOROTHIAZIDE 25 MG/1
25 TABLET ORAL DAILY
Qty: 30 TABLET | Refills: 5 | Status: ON HOLD | OUTPATIENT
Start: 2018-04-27 | End: 2021-12-15

## 2018-05-03 ENCOUNTER — HOSPITAL ENCOUNTER (OUTPATIENT)
Dept: PHYSICAL THERAPY | Facility: HOSPITAL | Age: 63
Setting detail: THERAPIES SERIES
Discharge: HOME | End: 2018-05-03

## 2018-05-03 DIAGNOSIS — M54.2 CERVICAL PAIN: Primary | ICD-10-CM

## 2018-05-03 PROCEDURE — G0283 ELEC STIM OTHER THAN WOUND: HCPCS

## 2018-05-03 PROCEDURE — 97110 THERAPEUTIC EXERCISES: CPT

## 2018-05-03 NOTE — THERAPY DISCHARGE NOTE
Outpatient Physical Therapy Ortho Treatment Note/Discharge Summary   [place]     Patient Name: David Frias  : 1955  MRN: [id number]  Today's Date: 5/3/2018      Visit Date: 2018    Visit Dx:    ICD-10-CM ICD-9-CM   1. Cervical pain M54.2 723.1       Patient Active Problem List   Diagnosis   • Abdominal pain   • Choledocholithiasis with obstruction        Past Medical History:   Diagnosis Date   • Hypertension     but refuses to take medication as he had a drop in blood pressure once        Past Surgical History:   Procedure Laterality Date   • CHOLECYSTECTOMY     • CHOLECYSTECTOMY WITH INTRAOPERATIVE CHOLANGIOGRAM N/A 8/3/2016    Procedure: CHOLECYSTECTOMY LAPAROSCOPIC INTRAOPERATIVE CHOLANGIOGRAM;  Surgeon: Garrett Barnes MD;  Location: [facility];  Service:    • FRACTURE SURGERY      ORIF RIGHT LEG    • HAND SURGERY Right     SECOND DIGIT AT FIRST AND SECOND JOINT SURGICAL REPAIR RELATED TO INJURY   • LEG SURGERY               PT Ortho     Row Name 18 0900       Sensory Screen for Light Touch- Upper Quarter Clearing    C4 (posterior shoulder) Bilateral:;Intact  -RT    C5 (lateral upper arm) Bilateral:;Intact  -RT    C6 (tip of thumb) Bilateral:;Intact  -RT    C7 (tip of 3rd finger) Bilateral:;Intact  -RT    C8 (tip of 5th finger) Bilateral:;Intact  -RT    T1 (medial lower arm) Bilateral:;Intact  -RT       Myotomal Screen- Upper Quarter Clearing    Shoulder flexion (C5) Bilateral:;5 (Normal)  -RT    Elbow flexion/wrist extension (C6) Bilateral:;5 (Normal)  -RT    Elbow extension/wrist flexion (C7) Bilateral:;5 (Normal)  -RT       Cervical/Shoulder ROM Screen    Cervical flexion --   32  -RT    Cervical extension --   46  -RT    Cervical lateral flexion --   right 25; left 20  -RT    Cervical rotation --   bilat 45  -RT      User Key  (r) = Recorded By, (t) = Taken By, (c) = Cosigned By    Initials Name Provider Type    RT Gary S Matty, PT Physical Therapist                             PT Assessment/Plan     Row Name 05/03/18 0957          PT Assessment    Assessment Comments Tx today consisted of mh and estim to cervical region followed by discharge instruction.  Pt kavitha tx well today.  -RT        PT Plan    PT Plan Comments See discharge.  -RT       User Key  (r) = Recorded By, (t) = Taken By, (c) = Cosigned By    Initials Name Provider Type    RT Gary Starr, PT Physical Therapist                Modalities     Row Name 05/03/18 0900             Subjective Comments    Subjective Comments Pt reports he will follow up with his MD about his cont pain.  -RT         Subjective Pain    Able to rate subjective pain? yes  -RT      Pre-Treatment Pain Level 3  -RT      Post-Treatment Pain Level 3  -RT         Moist Heat    MH Applied Yes  -RT      Location cervical region  -RT      Rx Minutes 10 mins  -RT         ELECTRICAL STIMULATION    Attended/Unattended Unattended  -RT      Stimulation Type IFC  -RT      Location/Electrode Placement/Other cervical region  -RT      Rx Minutes 10 mins  -RT        User Key  (r) = Recorded By, (t) = Taken By, (c) = Cosigned By    Initials Name Provider Type    RT Gary Starr, PT Physical Therapist                Exercises     Row Name 05/03/18 0900             Subjective Comments    Subjective Comments Pt reports he will follow up with his MD about his cont pain.  -RT         Subjective Pain    Able to rate subjective pain? yes  -RT      Pre-Treatment Pain Level 3  -RT      Post-Treatment Pain Level 3  -RT         Exercise 1    Exercise Name 1 crom, cerv retration, scap squeeze, ut stretch  -RT      Cueing 1 Verbal;Tactile;Demo  -RT      Time 1 25 min  -RT        User Key  (r) = Recorded By, (t) = Taken By, (c) = Cosigned By    Initials Name Provider Type    RT Gary Starr, PT Physical Therapist                               PT OP Goals     Row Name 05/03/18 0900          PT Short Term Goals    STG Date to Achieve 04/27/18  -RT     STG 1 Pt will be  instructed in HEP for improved independence.  -RT     STG 1 Progress Met  -RT     STG 2 Pt will demonstrate a 5 degree improvement in CROM for improved mobility and function.  -RT     STG 2 Progress Progressing  -RT     STG 3 Pt will report a maximum of 7/10 cervical pain with daily activities.  -RT     STG 3 Progress Not Met  -RT        Long Term Goals    LTG Date to Achieve 05/13/18  -RT     LTG 1 Pt will demonstrate CROM within normal limits for improved mobility and function.  -RT     LTG 1 Progress Not Met  -RT     LTG 2 Pt will report a maximum of 4/10 cervical pain with daily activities.  -RT     LTG 2 Progress Not Met  -RT     LTG 3 Pt will report less than 20% impairment on the NDI for improved functional independence.  -RT     LTG 3 Progress Progressing  -RT     LTG 4 Pt will report the ability to sleep with no interruptions due to cervical pain for improved QOL.  -RT     LTG 4 Progress Not Met  -RT       User Key  (r) = Recorded By, (t) = Taken By, (c) = Cosigned By    Initials Name Provider Type    RT Gary Starr PT Physical Therapist          Therapy Education  Given: HEP, Pain management, Posture/body mechanics  Program: Reinforced  How Provided: Verbal, Demonstration  Provided to: Patient  Level of Understanding: Teach back education performed, Verbalized, Demonstrated              Time Calculation:   Start Time: 0905  Stop Time: 0945  Time Calculation (min): 40 min    Therapy Charges for Today     Code Description Service Date Service Provider Modifiers Qty    16326012166  PT THER PROC EA 15 MIN 5/3/2018 Gary Starr PT GP 2    [id number]  PT ELECTRICAL STIM UNATTENDED 5/3/2018 Gary Starr PT  1                OP PT Discharge Summary  Date of Discharge: 05/03/18  Reason for Discharge: Maximum functional potential achieved  Outcomes Achieved: Patient able to partially acheive established goals  Discharge Destination: Home with home program  Discharge Instructions/Additional  Comments: Pt has demonstrated good attendance and effort at this time yet has demonstrated no long term relief in pain.  Pt has been instructed in a HEP and instructed on mechanics to reduce pain.  Pt may benefit from othert diagnositic testing due to cont reports of pain radiating into bilateral shoulder regions.  Pt has been advised to follow up with his MD due to max gains with therapy at this time.      Gary Starr, PT  5/3/2018

## 2018-05-11 NOTE — PROGRESS NOTES
"David Frias     VITALS: Blood pressure 140/90, pulse 89, height 182.9 cm (72.01\"), weight 92.5 kg (204 lb), SpO2 97 %.    Subjective  Chief Complaint:   Chief Complaint   Patient presents with   • Follow-up   • Hypertension        History of Present Illness:  Patient is a 62 y.o.  male with a medical history significant for hypertension and neck pain who presents to clinic secondary to medical followup. No new or acute concerns today. Skin tag removal from the eyelid planned by Dr. Short on 5/10/18.    Patient has hypertension and is on HCTZ 12.5 mg daily, Lisinopril 20 mg daily and hydralazine 25 mg orally TID. Denies any side effects of the medications. Denies any dizziness, lightheadedness, blurry vision, chest pain, or edema.   Home blood pressure: No  Low salt diet: No    Patient continues to have cervical neck pain. He has tried OTC medications without any success.     No complaints about any of the medications.    The following portions of the patient's history were reviewed and updated as appropriate: allergies, current medications, past family history, past medical history, past social history, past surgical history and problem list.    Past Medical History  Past Medical History:   Diagnosis Date   • Hypertension     but refuses to take medication as he had a drop in blood pressure once       Review of Systems  Constitutional: Denies any recent history of HAs, dizziness, fevers, chills, itching.  Eyes: Denies any changes in vision. Denies any blurry vision or diplopia.  Ears, Nose, Mouth, Throat: Denies any sore throat, rhinorrhea, or cough.  Cardiovascular: Denies any chest pain, pressure, or palpitations.  Respiratory: Denies any shortness of breath or wheezing.  Gastrointestinal: Denies any abdominal pain, nausea, vomiting, diarrhea, or constipation.  Genitourinary: Denies any changes in urination.  Musculoskeletal: Denies any muscle weakness.  Skin and/or breasts: Denies any rashes.  Neurological: " Denies any changes in balance or gait.  Psychiatric: Denies any anxiety, depression, or insomnia. Denies any suicidal or homicidal ideations.  Endocrine: Denies any heat or cold intolerance. Denies any voice changes, polydipsia, or polyuria.  Hematologic/Lymphatic: Denies any anemia or easy bruising.    Surgical History  Past Surgical History:   Procedure Laterality Date   • CHOLECYSTECTOMY     • CHOLECYSTECTOMY WITH INTRAOPERATIVE CHOLANGIOGRAM N/A 8/3/2016    Procedure: CHOLECYSTECTOMY LAPAROSCOPIC INTRAOPERATIVE CHOLANGIOGRAM;  Surgeon: Garrett Barnes MD;  Location: [facility];  Service:    • FRACTURE SURGERY  2015    ORIF RIGHT LEG    • HAND SURGERY Right     SECOND DIGIT AT FIRST AND SECOND JOINT SURGICAL REPAIR RELATED TO INJURY   • LEG SURGERY         Family History  Family History   Problem Relation Age of Onset   • Heart disease Paternal Grandmother    • Hypertension Sister    • Cancer Sister    • Depression Brother        Social History  Social History     Social History   • Marital status:      Spouse name: N/A   • Number of children: N/A   • Years of education: N/A     Occupational History   • Not on file.     Social History Main Topics   • Smoking status: Former Smoker     Packs/day: 1.00     Years: 15.00     Types: Cigarettes   • Smokeless tobacco: Never Used      Comment: STOPPED SMOKING 10-12 YEARS AGO   • Alcohol use No   • Drug use: No   • Sexual activity: Defer     Other Topics Concern   • Not on file     Social History Narrative   • No narrative on file       Objective  Physical Exam  Gen: Patient in NAD. Pleasant and answers appropriately. A&Ox3.    Skin: Warm and dry with normal turgor. No purpura, rashes, or unusual pigmentation noted. Hair is normal in appearance and distribution.    HEENT: NC/AT. No lesions noted. Conjunctiva clear, sclera nonicteric. PERRL. EOMI without nystagmus or strabismus. Fundi appear benign. No hemorrhages or exudates of eyes. Auditory canals are patent  bilaterally without lesions. TMs intact,  nonerythematous, nonbulging without lesions. Nasal mucosa pink, nonerythematous, and nonedematous. Frontal and maxillary sinuses are nontender. O/P nonerythematous and moist without exudate.    Neck: Supple without lymph nodes palpated. FROM.     Lungs: CTA B/L without rales, rhonchi, crackles, or wheezes.    Heart: RRR. S1 and S2 normal. No S3 or S4. No MRGT.    Abd: Soft, nontender,nondistended. (+)BSx4 quadrants.     Extrem: No CCE. Radial pulses 2+/4 and equal B/L. FROMx4. No bone, joint, or muscle tenderness noted.    Neuro: No focal motor/sensory deficits.    Procedures    Assessment/Plan  David Frias is a 62 y.o. here for medical followup.  Diagnoses and all orders for this visit:    Essential hypertension  -     hydrochlorothiazide (HYDRODIURIL) 25 MG tablet; Take 1 tablet by mouth Daily.    Cervical neck pain  -     diclofenac (VOLTAREN) 1 % gel gel; Apply 4 g topically 4 (Four) Times a Day As Needed (cervical neck pain).    Preventative Medicine  Declines colonoscopy.     Findings and plans discussed with patient who verbalizes understanding and agreement. Will followup with patient once results are in. Patient to followup at clinic PRN or in six months for further medical followup.    Barb Gunderson MD

## 2021-11-29 ENCOUNTER — APPOINTMENT (OUTPATIENT)
Dept: CT IMAGING | Facility: HOSPITAL | Age: 66
End: 2021-11-29

## 2021-11-29 ENCOUNTER — HOSPITAL ENCOUNTER (EMERGENCY)
Facility: HOSPITAL | Age: 66
Discharge: HOME | End: 2021-11-29
Attending: STUDENT IN AN ORGANIZED HEALTH CARE EDUCATION/TRAINING PROGRAM | Admitting: STUDENT IN AN ORGANIZED HEALTH CARE EDUCATION/TRAINING PROGRAM

## 2021-11-29 VITALS
BODY MASS INDEX: 21.67 KG/M2 | OXYGEN SATURATION: 98 % | RESPIRATION RATE: 16 BRPM | TEMPERATURE: 97.8 F | HEIGHT: 72 IN | HEART RATE: 52 BPM | WEIGHT: 160 LBS | SYSTOLIC BLOOD PRESSURE: 192 MMHG | DIASTOLIC BLOOD PRESSURE: 115 MMHG

## 2021-11-29 DIAGNOSIS — N17.9 AKI (ACUTE KIDNEY INJURY) (HCC): Primary | ICD-10-CM

## 2021-11-29 DIAGNOSIS — R33.8 ACUTE URINARY RETENTION: ICD-10-CM

## 2021-11-29 DIAGNOSIS — N39.0 ACUTE UTI: ICD-10-CM

## 2021-11-29 LAB
ALBUMIN SERPL-MCNC: 3.81 G/DL (ref 3.5–5.2)
ALBUMIN/GLOB SERPL: 1.1 G/DL
ALP SERPL-CCNC: 101 U/L (ref 39–117)
ALT SERPL W P-5'-P-CCNC: 27 U/L (ref 1–41)
ANION GAP SERPL CALCULATED.3IONS-SCNC: 12.3 MMOL/L (ref 5–15)
AST SERPL-CCNC: 64 U/L (ref 1–40)
BACTERIA UR QL AUTO: ABNORMAL /HPF
BASOPHILS # BLD AUTO: 0.03 10*3/MM3 (ref 0–0.2)
BASOPHILS NFR BLD AUTO: 0.1 % (ref 0–1.5)
BILIRUB SERPL-MCNC: 0.8 MG/DL (ref 0–1.2)
BILIRUB UR QL STRIP: NEGATIVE
BUN SERPL-MCNC: 21 MG/DL (ref 8–23)
BUN/CREAT SERPL: 9.9 (ref 7–25)
CALCIUM SPEC-SCNC: 9.7 MG/DL (ref 8.6–10.5)
CHLORIDE SERPL-SCNC: 96 MMOL/L (ref 98–107)
CLARITY UR: CLEAR
CO2 SERPL-SCNC: 23.7 MMOL/L (ref 22–29)
COLOR UR: YELLOW
CREAT SERPL-MCNC: 2.12 MG/DL (ref 0.76–1.27)
DEPRECATED RDW RBC AUTO: 45.3 FL (ref 37–54)
EOSINOPHIL # BLD AUTO: 0.02 10*3/MM3 (ref 0–0.4)
EOSINOPHIL NFR BLD AUTO: 0.1 % (ref 0.3–6.2)
ERYTHROCYTE [DISTWIDTH] IN BLOOD BY AUTOMATED COUNT: 12.1 % (ref 12.3–15.4)
GFR SERPL CREATININE-BSD FRML MDRD: 31 ML/MIN/1.73
GLOBULIN UR ELPH-MCNC: 3.4 GM/DL
GLUCOSE SERPL-MCNC: 167 MG/DL (ref 65–99)
GLUCOSE UR STRIP-MCNC: NEGATIVE MG/DL
HCT VFR BLD AUTO: 51.9 % (ref 37.5–51)
HGB BLD-MCNC: 18.4 G/DL (ref 13–17.7)
HGB UR QL STRIP.AUTO: ABNORMAL
HOLD SPECIMEN: NORMAL
HOLD SPECIMEN: NORMAL
HYALINE CASTS UR QL AUTO: ABNORMAL /LPF
IMM GRANULOCYTES # BLD AUTO: 0.13 10*3/MM3 (ref 0–0.05)
IMM GRANULOCYTES NFR BLD AUTO: 0.6 % (ref 0–0.5)
KETONES UR QL STRIP: ABNORMAL
LEUKOCYTE ESTERASE UR QL STRIP.AUTO: NEGATIVE
LIPASE SERPL-CCNC: 21 U/L (ref 13–60)
LYMPHOCYTES # BLD AUTO: 0.62 10*3/MM3 (ref 0.7–3.1)
LYMPHOCYTES NFR BLD AUTO: 3.1 % (ref 19.6–45.3)
MCH RBC QN AUTO: 35.4 PG (ref 26.6–33)
MCHC RBC AUTO-ENTMCNC: 35.5 G/DL (ref 31.5–35.7)
MCV RBC AUTO: 99.8 FL (ref 79–97)
MONOCYTES # BLD AUTO: 1.89 10*3/MM3 (ref 0.1–0.9)
MONOCYTES NFR BLD AUTO: 9.4 % (ref 5–12)
NEUTROPHILS NFR BLD AUTO: 17.49 10*3/MM3 (ref 1.7–7)
NEUTROPHILS NFR BLD AUTO: 86.7 % (ref 42.7–76)
NITRITE UR QL STRIP: NEGATIVE
NRBC BLD AUTO-RTO: 0 /100 WBC (ref 0–0.2)
PH UR STRIP.AUTO: 6 [PH] (ref 5–8)
PLATELET # BLD AUTO: 214 10*3/MM3 (ref 140–450)
PMV BLD AUTO: 10.3 FL (ref 6–12)
POTASSIUM SERPL-SCNC: 4.7 MMOL/L (ref 3.5–5.2)
PROT SERPL-MCNC: 7.2 G/DL (ref 6–8.5)
PROT UR QL STRIP: ABNORMAL
RBC # BLD AUTO: 5.2 10*6/MM3 (ref 4.14–5.8)
RBC # UR STRIP: ABNORMAL /HPF
REF LAB TEST METHOD: ABNORMAL
SODIUM SERPL-SCNC: 132 MMOL/L (ref 136–145)
SP GR UR STRIP: 1.02 (ref 1–1.03)
SQUAMOUS #/AREA URNS HPF: ABNORMAL /HPF
UROBILINOGEN UR QL STRIP: ABNORMAL
WBC # UR STRIP: ABNORMAL /HPF
WBC NRBC COR # BLD: 20.18 10*3/MM3 (ref 3.4–10.8)
WHOLE BLOOD HOLD SPECIMEN: NORMAL
WHOLE BLOOD HOLD SPECIMEN: NORMAL

## 2021-11-29 PROCEDURE — 99283 EMERGENCY DEPT VISIT LOW MDM: CPT

## 2021-11-29 PROCEDURE — 96375 TX/PRO/DX INJ NEW DRUG ADDON: CPT

## 2021-11-29 PROCEDURE — 51702 INSERT TEMP BLADDER CATH: CPT

## 2021-11-29 PROCEDURE — 25010000002 HYDRALAZINE PER 20 MG: Performed by: PHYSICIAN ASSISTANT

## 2021-11-29 PROCEDURE — 74176 CT ABD & PELVIS W/O CONTRAST: CPT

## 2021-11-29 PROCEDURE — 25010000002 CEFTRIAXONE PER 250 MG: Performed by: PHYSICIAN ASSISTANT

## 2021-11-29 PROCEDURE — 74176 CT ABD & PELVIS W/O CONTRAST: CPT | Performed by: RADIOLOGY

## 2021-11-29 PROCEDURE — 83690 ASSAY OF LIPASE: CPT | Performed by: PHYSICIAN ASSISTANT

## 2021-11-29 PROCEDURE — 85025 COMPLETE CBC W/AUTO DIFF WBC: CPT | Performed by: PHYSICIAN ASSISTANT

## 2021-11-29 PROCEDURE — 80053 COMPREHEN METABOLIC PANEL: CPT | Performed by: PHYSICIAN ASSISTANT

## 2021-11-29 PROCEDURE — 96365 THER/PROPH/DIAG IV INF INIT: CPT

## 2021-11-29 PROCEDURE — 36415 COLL VENOUS BLD VENIPUNCTURE: CPT

## 2021-11-29 PROCEDURE — 81001 URINALYSIS AUTO W/SCOPE: CPT | Performed by: PHYSICIAN ASSISTANT

## 2021-11-29 RX ORDER — HYDRALAZINE HYDROCHLORIDE 20 MG/ML
10 INJECTION INTRAMUSCULAR; INTRAVENOUS ONCE
Status: COMPLETED | OUTPATIENT
Start: 2021-11-29 | End: 2021-11-29

## 2021-11-29 RX ORDER — SODIUM CHLORIDE 0.9 % (FLUSH) 0.9 %
10 SYRINGE (ML) INJECTION AS NEEDED
Status: DISCONTINUED | OUTPATIENT
Start: 2021-11-29 | End: 2021-11-29 | Stop reason: HOSPADM

## 2021-11-29 RX ORDER — CEFDINIR 300 MG/1
300 CAPSULE ORAL 2 TIMES DAILY
Qty: 20 CAPSULE | Refills: 0 | Status: SHIPPED | OUTPATIENT
Start: 2021-11-29 | End: 2021-12-09

## 2021-11-29 RX ADMIN — SODIUM CHLORIDE 1000 ML: 9 INJECTION, SOLUTION INTRAVENOUS at 14:00

## 2021-11-29 RX ADMIN — HYDRALAZINE HYDROCHLORIDE 10 MG: 20 INJECTION INTRAMUSCULAR; INTRAVENOUS at 14:08

## 2021-11-29 RX ADMIN — CEFTRIAXONE 1 G: 1 INJECTION, POWDER, FOR SOLUTION INTRAMUSCULAR; INTRAVENOUS at 14:00

## 2021-11-30 ENCOUNTER — OFFICE VISIT (OUTPATIENT)
Dept: UROLOGY | Facility: CLINIC | Age: 66
End: 2021-11-30

## 2021-11-30 VITALS — WEIGHT: 161 LBS | HEIGHT: 72 IN | BODY MASS INDEX: 21.81 KG/M2

## 2021-11-30 DIAGNOSIS — N40.1 BPH WITH URINARY OBSTRUCTION: Primary | ICD-10-CM

## 2021-11-30 DIAGNOSIS — N13.8 BPH WITH URINARY OBSTRUCTION: Primary | ICD-10-CM

## 2021-11-30 PROCEDURE — 99203 OFFICE O/P NEW LOW 30 MIN: CPT | Performed by: UROLOGY

## 2021-11-30 RX ORDER — FINASTERIDE 5 MG/1
5 TABLET, FILM COATED ORAL DAILY
Qty: 30 TABLET | Refills: 5 | Status: SHIPPED | OUTPATIENT
Start: 2021-11-30

## 2021-11-30 RX ORDER — TAMSULOSIN HYDROCHLORIDE 0.4 MG/1
1 CAPSULE ORAL NIGHTLY
Qty: 30 CAPSULE | Refills: 5 | Status: SHIPPED | OUTPATIENT
Start: 2021-11-30

## 2021-12-02 ENCOUNTER — OFFICE VISIT (OUTPATIENT)
Dept: UROLOGY | Facility: CLINIC | Age: 66
End: 2021-12-02

## 2021-12-02 VITALS — WEIGHT: 161 LBS | HEIGHT: 72 IN | BODY MASS INDEX: 21.81 KG/M2

## 2021-12-02 DIAGNOSIS — N13.8 BPH WITH URINARY OBSTRUCTION: Primary | ICD-10-CM

## 2021-12-02 DIAGNOSIS — N40.1 BPH WITH URINARY OBSTRUCTION: Primary | ICD-10-CM

## 2021-12-02 PROCEDURE — 99213 OFFICE O/P EST LOW 20 MIN: CPT | Performed by: UROLOGY

## 2021-12-02 NOTE — PROGRESS NOTES
Chief Complaint:          Chief Complaint   Patient presents with   • Benign Prostatic Hypertrophy       HPI:   66 y.o. male returns today.  He failed his voiding trial he had a couple liters in his bladder area could the Oates he will need a TURP.  He is in quite a bit of discomfort.  He tolerated it well      Past Medical History:        Past Medical History:   Diagnosis Date   • Hypertension     but refuses to take medication as he had a drop in blood pressure once         Current Meds:     Current Outpatient Medications   Medication Sig Dispense Refill   • cefdinir (OMNICEF) 300 MG capsule Take 1 capsule by mouth 2 (Two) Times a Day for 10 days. 20 capsule 0   • cholecalciferol (VITAMIN D3) 1000 units tablet Take 1,000 Units by mouth Daily.     • diclofenac (VOLTAREN) 1 % gel gel Apply 4 g topically 4 (Four) Times a Day As Needed (cervical neck pain). 100 g 2   • diclofenac (VOLTAREN) 50 MG EC tablet Take 50 mg by mouth 2 (Two) Times a Day As Needed.  2   • finasteride (PROSCAR) 5 MG tablet Take 1 tablet by mouth Daily. 30 tablet 5   • hydrALAZINE (APRESOLINE) 25 MG tablet Take 1 tablet by mouth 3 (three) times a day as needed (For sustained BP >/= 160/100). 42 tablet 0   • hydrochlorothiazide (HYDRODIURIL) 25 MG tablet Take 1 tablet by mouth Daily. 30 tablet 5   • lisinopril (PRINIVIL,ZESTRIL) 20 MG tablet Take 20 mg by mouth Daily.  2   • Omega-3 Fatty Acids (FISH OIL) 1000 MG capsule capsule Take 1,000 mg by mouth Daily With Breakfast.     • tamsulosin (Flomax) 0.4 MG capsule 24 hr capsule Take 1 capsule by mouth Every Night. 30 capsule 5     No current facility-administered medications for this visit.        Allergies:      No Known Allergies     Past Surgical History:     Past Surgical History:   Procedure Laterality Date   • CHOLECYSTECTOMY     • CHOLECYSTECTOMY WITH INTRAOPERATIVE CHOLANGIOGRAM N/A 8/3/2016    Procedure: CHOLECYSTECTOMY LAPAROSCOPIC INTRAOPERATIVE CHOLANGIOGRAM;  Surgeon: Garrett Barnes,  MD;  Location: [facility];  Service:    • FRACTURE SURGERY  2015    ORIF RIGHT LEG    • HAND SURGERY Right     SECOND DIGIT AT FIRST AND SECOND JOINT SURGICAL REPAIR RELATED TO INJURY   • LEG SURGERY           Social History:     Social History     Socioeconomic History   • Marital status:    Tobacco Use   • Smoking status: Former Smoker     Packs/day: 1.00     Years: 15.00     Pack years: 15.00     Types: Cigarettes   • Smokeless tobacco: Never Used   • Tobacco comment: STOPPED SMOKING 10-12 YEARS AGO   Substance and Sexual Activity   • Alcohol use: No   • Drug use: No   • Sexual activity: Defer       Family History:     Family History   Problem Relation Age of Onset   • Heart disease Paternal Grandmother    • Hypertension Sister    • Cancer Sister    • Depression Brother        Review of Systems:     Review of Systems   Constitutional: Negative.    HENT: Negative.    Eyes: Negative.    Respiratory: Negative.    Cardiovascular: Negative.    Gastrointestinal: Negative.    Endocrine: Negative.    Musculoskeletal: Negative.    Allergic/Immunologic: Negative.    Neurological: Negative.    Hematological: Negative.    Psychiatric/Behavioral: Negative.        Physical Exam:     Physical Exam  Vitals and nursing note reviewed.   Constitutional:       Appearance: He is well-developed.   HENT:      Head: Normocephalic and atraumatic.   Eyes:      Conjunctiva/sclera: Conjunctivae normal.      Pupils: Pupils are equal, round, and reactive to light.   Cardiovascular:      Rate and Rhythm: Normal rate and regular rhythm.      Heart sounds: Normal heart sounds.   Pulmonary:      Effort: Pulmonary effort is normal.      Breath sounds: Normal breath sounds.   Abdominal:      General: Bowel sounds are normal.      Palpations: Abdomen is soft.   Musculoskeletal:         General: Normal range of motion.      Cervical back: Normal range of motion.   Skin:     General: Skin is warm and dry.   Neurological:      Mental Status: He  is alert and oriented to person, place, and time.      Deep Tendon Reflexes: Reflexes are normal and symmetric.   Psychiatric:         Behavior: Behavior normal.         Thought Content: Thought content normal.         Judgment: Judgment normal.         I have reviewed the following portions of the patient's history: Allergies, current medications, past family history, past medical history, past social history, past surgical history, problem list, and ROS and confirm it is accurate.      Procedure:       Assessment/Plan:   Urinary retention secondary to BPH he failed a voiding trial he is on appropriate dual medication he will need a TURP              This document has been electronically signed by LISA GREY MD December 2, 2021 13:05 EST

## 2021-12-02 NOTE — H&P (VIEW-ONLY)
Chief Complaint:          Chief Complaint   Patient presents with   • Benign Prostatic Hypertrophy       HPI:   66 y.o. male returns today.  He failed his voiding trial he had a couple liters in his bladder area could the Oates he will need a TURP.  He is in quite a bit of discomfort.  He tolerated it well      Past Medical History:        Past Medical History:   Diagnosis Date   • Hypertension     but refuses to take medication as he had a drop in blood pressure once         Current Meds:     Current Outpatient Medications   Medication Sig Dispense Refill   • cefdinir (OMNICEF) 300 MG capsule Take 1 capsule by mouth 2 (Two) Times a Day for 10 days. 20 capsule 0   • cholecalciferol (VITAMIN D3) 1000 units tablet Take 1,000 Units by mouth Daily.     • diclofenac (VOLTAREN) 1 % gel gel Apply 4 g topically 4 (Four) Times a Day As Needed (cervical neck pain). 100 g 2   • diclofenac (VOLTAREN) 50 MG EC tablet Take 50 mg by mouth 2 (Two) Times a Day As Needed.  2   • finasteride (PROSCAR) 5 MG tablet Take 1 tablet by mouth Daily. 30 tablet 5   • hydrALAZINE (APRESOLINE) 25 MG tablet Take 1 tablet by mouth 3 (three) times a day as needed (For sustained BP >/= 160/100). 42 tablet 0   • hydrochlorothiazide (HYDRODIURIL) 25 MG tablet Take 1 tablet by mouth Daily. 30 tablet 5   • lisinopril (PRINIVIL,ZESTRIL) 20 MG tablet Take 20 mg by mouth Daily.  2   • Omega-3 Fatty Acids (FISH OIL) 1000 MG capsule capsule Take 1,000 mg by mouth Daily With Breakfast.     • tamsulosin (Flomax) 0.4 MG capsule 24 hr capsule Take 1 capsule by mouth Every Night. 30 capsule 5     No current facility-administered medications for this visit.        Allergies:      No Known Allergies     Past Surgical History:     Past Surgical History:   Procedure Laterality Date   • CHOLECYSTECTOMY     • CHOLECYSTECTOMY WITH INTRAOPERATIVE CHOLANGIOGRAM N/A 8/3/2016    Procedure: CHOLECYSTECTOMY LAPAROSCOPIC INTRAOPERATIVE CHOLANGIOGRAM;  Surgeon: Garrett Barnes,  MD;  Location: [facility];  Service:    • FRACTURE SURGERY  2015    ORIF RIGHT LEG    • HAND SURGERY Right     SECOND DIGIT AT FIRST AND SECOND JOINT SURGICAL REPAIR RELATED TO INJURY   • LEG SURGERY           Social History:     Social History     Socioeconomic History   • Marital status:    Tobacco Use   • Smoking status: Former Smoker     Packs/day: 1.00     Years: 15.00     Pack years: 15.00     Types: Cigarettes   • Smokeless tobacco: Never Used   • Tobacco comment: STOPPED SMOKING 10-12 YEARS AGO   Substance and Sexual Activity   • Alcohol use: No   • Drug use: No   • Sexual activity: Defer       Family History:     Family History   Problem Relation Age of Onset   • Heart disease Paternal Grandmother    • Hypertension Sister    • Cancer Sister    • Depression Brother        Review of Systems:     Review of Systems   Constitutional: Negative.    HENT: Negative.    Eyes: Negative.    Respiratory: Negative.    Cardiovascular: Negative.    Gastrointestinal: Negative.    Endocrine: Negative.    Musculoskeletal: Negative.    Allergic/Immunologic: Negative.    Neurological: Negative.    Hematological: Negative.    Psychiatric/Behavioral: Negative.        Physical Exam:     Physical Exam  Vitals and nursing note reviewed.   Constitutional:       Appearance: He is well-developed.   HENT:      Head: Normocephalic and atraumatic.   Eyes:      Conjunctiva/sclera: Conjunctivae normal.      Pupils: Pupils are equal, round, and reactive to light.   Cardiovascular:      Rate and Rhythm: Normal rate and regular rhythm.      Heart sounds: Normal heart sounds.   Pulmonary:      Effort: Pulmonary effort is normal.      Breath sounds: Normal breath sounds.   Abdominal:      General: Bowel sounds are normal.      Palpations: Abdomen is soft.   Musculoskeletal:         General: Normal range of motion.      Cervical back: Normal range of motion.   Skin:     General: Skin is warm and dry.   Neurological:      Mental Status: He  is alert and oriented to person, place, and time.      Deep Tendon Reflexes: Reflexes are normal and symmetric.   Psychiatric:         Behavior: Behavior normal.         Thought Content: Thought content normal.         Judgment: Judgment normal.         I have reviewed the following portions of the patient's history: Allergies, current medications, past family history, past medical history, past social history, past surgical history, problem list, and ROS and confirm it is accurate.      Procedure:       Assessment/Plan:   Urinary retention secondary to BPH he failed a voiding trial he is on appropriate dual medication he will need a TURP              This document has been electronically signed by LISA GREY MD December 2, 2021 13:05 EST

## 2021-12-05 PROBLEM — N13.8 BPH WITH URINARY OBSTRUCTION: Status: ACTIVE | Noted: 2021-12-05

## 2021-12-05 PROBLEM — N40.1 BPH WITH URINARY OBSTRUCTION: Status: ACTIVE | Noted: 2021-12-05

## 2021-12-07 DIAGNOSIS — N13.8 BPH WITH URINARY OBSTRUCTION: Primary | ICD-10-CM

## 2021-12-07 DIAGNOSIS — N40.1 BPH WITH URINARY OBSTRUCTION: Primary | ICD-10-CM

## 2021-12-07 RX ORDER — GENTAMICIN SULFATE 80 MG/100ML
80 INJECTION, SOLUTION INTRAVENOUS ONCE
Status: CANCELLED | OUTPATIENT
Start: 2021-12-07 | End: 2021-12-07

## 2021-12-08 DIAGNOSIS — N13.8 BPH WITH URINARY OBSTRUCTION: Primary | ICD-10-CM

## 2021-12-08 DIAGNOSIS — N40.1 BPH WITH URINARY OBSTRUCTION: Primary | ICD-10-CM

## 2021-12-10 ENCOUNTER — LAB (OUTPATIENT)
Dept: LAB | Facility: HOSPITAL | Age: 66
End: 2021-12-10

## 2021-12-10 DIAGNOSIS — N40.1 BPH WITH URINARY OBSTRUCTION: ICD-10-CM

## 2021-12-10 DIAGNOSIS — N13.8 BPH WITH URINARY OBSTRUCTION: ICD-10-CM

## 2021-12-13 ENCOUNTER — LAB (OUTPATIENT)
Dept: LAB | Facility: HOSPITAL | Age: 66
End: 2021-12-13

## 2021-12-13 ENCOUNTER — PRE-ADMISSION TESTING (OUTPATIENT)
Dept: PREADMISSION TESTING | Facility: HOSPITAL | Age: 66
End: 2021-12-13

## 2021-12-13 DIAGNOSIS — N40.1 BPH WITH URINARY OBSTRUCTION: Primary | ICD-10-CM

## 2021-12-13 DIAGNOSIS — N13.8 BPH WITH URINARY OBSTRUCTION: Primary | ICD-10-CM

## 2021-12-13 PROCEDURE — C9803 HOPD COVID-19 SPEC COLLECT: HCPCS

## 2021-12-13 PROCEDURE — 93005 ELECTROCARDIOGRAM TRACING: CPT

## 2021-12-13 PROCEDURE — 93010 ELECTROCARDIOGRAM REPORT: CPT | Performed by: INTERNAL MEDICINE

## 2021-12-13 PROCEDURE — U0004 COV-19 TEST NON-CDC HGH THRU: HCPCS

## 2021-12-13 NOTE — DISCHARGE INSTRUCTIONS

## 2021-12-14 LAB
QT INTERVAL: 362 MS
QTC INTERVAL: 447 MS
SARS-COV-2 RNA PNL SPEC NAA+PROBE: NOT DETECTED

## 2021-12-15 ENCOUNTER — ANESTHESIA EVENT (OUTPATIENT)
Dept: PERIOP | Facility: HOSPITAL | Age: 66
End: 2021-12-15

## 2021-12-15 ENCOUNTER — APPOINTMENT (OUTPATIENT)
Dept: GENERAL RADIOLOGY | Facility: HOSPITAL | Age: 66
End: 2021-12-15

## 2021-12-15 ENCOUNTER — ANESTHESIA (OUTPATIENT)
Dept: PERIOP | Facility: HOSPITAL | Age: 66
End: 2021-12-15

## 2021-12-15 ENCOUNTER — HOSPITAL ENCOUNTER (OUTPATIENT)
Facility: HOSPITAL | Age: 66
Discharge: HOME | End: 2021-12-16
Attending: UROLOGY | Admitting: UROLOGY

## 2021-12-15 DIAGNOSIS — N13.8 BPH WITH URINARY OBSTRUCTION: ICD-10-CM

## 2021-12-15 DIAGNOSIS — N40.1 BPH WITH URINARY OBSTRUCTION: ICD-10-CM

## 2021-12-15 PROCEDURE — 25010000002 FUROSEMIDE PER 20 MG: Performed by: UROLOGY

## 2021-12-15 PROCEDURE — 25010000002 GENTAMICIN PER 80 MG: Performed by: UROLOGY

## 2021-12-15 PROCEDURE — 25010000002 DROPERIDOL PER 5 MG: Performed by: NURSE ANESTHETIST, CERTIFIED REGISTERED

## 2021-12-15 PROCEDURE — 25010000002 ONDANSETRON PER 1 MG: Performed by: NURSE ANESTHETIST, CERTIFIED REGISTERED

## 2021-12-15 PROCEDURE — 25010000002 KETOROLAC TROMETHAMINE PER 15 MG: Performed by: NURSE ANESTHETIST, CERTIFIED REGISTERED

## 2021-12-15 PROCEDURE — G0378 HOSPITAL OBSERVATION PER HR: HCPCS

## 2021-12-15 PROCEDURE — 25010000002 FENTANYL CITRATE (PF) 50 MCG/ML SOLUTION: Performed by: NURSE ANESTHETIST, CERTIFIED REGISTERED

## 2021-12-15 PROCEDURE — 25010000002 HYDRALAZINE PER 20 MG: Performed by: NURSE ANESTHETIST, CERTIFIED REGISTERED

## 2021-12-15 PROCEDURE — 0 MEPERIDINE PER 100 MG: Performed by: NURSE ANESTHETIST, CERTIFIED REGISTERED

## 2021-12-15 PROCEDURE — 52601 PROSTATECTOMY (TURP): CPT | Performed by: UROLOGY

## 2021-12-15 PROCEDURE — 25010000002 PROPOFOL 10 MG/ML EMULSION: Performed by: NURSE ANESTHETIST, CERTIFIED REGISTERED

## 2021-12-15 PROCEDURE — 25010000002 SODIUM CHLORIDE 0.9 % WITH KCL 20 MEQ 20-0.9 MEQ/L-% SOLUTION: Performed by: UROLOGY

## 2021-12-15 RX ORDER — ATROPA BELLADONNA AND OPIUM 16.2; 3 MG/1; MG/1
SUPPOSITORY RECTAL AS NEEDED
Status: DISCONTINUED | OUTPATIENT
Start: 2021-12-15 | End: 2021-12-15 | Stop reason: HOSPADM

## 2021-12-15 RX ORDER — ONDANSETRON 2 MG/ML
4 INJECTION INTRAMUSCULAR; INTRAVENOUS AS NEEDED
Status: DISCONTINUED | OUTPATIENT
Start: 2021-12-15 | End: 2021-12-15 | Stop reason: HOSPADM

## 2021-12-15 RX ORDER — FUROSEMIDE 10 MG/ML
10 INJECTION INTRAMUSCULAR; INTRAVENOUS ONCE
Status: COMPLETED | OUTPATIENT
Start: 2021-12-15 | End: 2021-12-15

## 2021-12-15 RX ORDER — FAMOTIDINE 10 MG/ML
INJECTION, SOLUTION INTRAVENOUS AS NEEDED
Status: DISCONTINUED | OUTPATIENT
Start: 2021-12-15 | End: 2021-12-15 | Stop reason: SURG

## 2021-12-15 RX ORDER — PROMETHAZINE HYDROCHLORIDE 12.5 MG/1
12.5 TABLET ORAL EVERY 6 HOURS PRN
Status: DISCONTINUED | OUTPATIENT
Start: 2021-12-15 | End: 2021-12-16 | Stop reason: HOSPADM

## 2021-12-15 RX ORDER — MAGNESIUM HYDROXIDE 1200 MG/15ML
LIQUID ORAL AS NEEDED
Status: DISCONTINUED | OUTPATIENT
Start: 2021-12-15 | End: 2021-12-15 | Stop reason: HOSPADM

## 2021-12-15 RX ORDER — LIDOCAINE HYDROCHLORIDE 20 MG/ML
INJECTION, SOLUTION INFILTRATION; PERINEURAL AS NEEDED
Status: DISCONTINUED | OUTPATIENT
Start: 2021-12-15 | End: 2021-12-15 | Stop reason: SURG

## 2021-12-15 RX ORDER — KETOROLAC TROMETHAMINE 30 MG/ML
15 INJECTION, SOLUTION INTRAMUSCULAR; INTRAVENOUS EVERY 6 HOURS PRN
Status: COMPLETED | OUTPATIENT
Start: 2021-12-15 | End: 2021-12-15

## 2021-12-15 RX ORDER — SODIUM CHLORIDE 0.9 % (FLUSH) 0.9 %
10 SYRINGE (ML) INJECTION EVERY 12 HOURS SCHEDULED
Status: DISCONTINUED | OUTPATIENT
Start: 2021-12-15 | End: 2021-12-15 | Stop reason: HOSPADM

## 2021-12-15 RX ORDER — HYDRALAZINE HYDROCHLORIDE 20 MG/ML
INJECTION INTRAMUSCULAR; INTRAVENOUS AS NEEDED
Status: DISCONTINUED | OUTPATIENT
Start: 2021-12-15 | End: 2021-12-15 | Stop reason: SURG

## 2021-12-15 RX ORDER — SODIUM CHLORIDE, SODIUM LACTATE, POTASSIUM CHLORIDE, CALCIUM CHLORIDE 600; 310; 30; 20 MG/100ML; MG/100ML; MG/100ML; MG/100ML
INJECTION, SOLUTION INTRAVENOUS CONTINUOUS PRN
Status: DISCONTINUED | OUTPATIENT
Start: 2021-12-15 | End: 2021-12-15 | Stop reason: SURG

## 2021-12-15 RX ORDER — SODIUM CHLORIDE 0.9 % (FLUSH) 0.9 %
10 SYRINGE (ML) INJECTION AS NEEDED
Status: DISCONTINUED | OUTPATIENT
Start: 2021-12-15 | End: 2021-12-15 | Stop reason: HOSPADM

## 2021-12-15 RX ORDER — LECITHIN/PYRIDOXINE/KELP
1 CAPSULE ORAL DAILY
COMMUNITY

## 2021-12-15 RX ORDER — HYDROMORPHONE HYDROCHLORIDE 1 MG/ML
0.5 INJECTION, SOLUTION INTRAMUSCULAR; INTRAVENOUS; SUBCUTANEOUS
Status: DISCONTINUED | OUTPATIENT
Start: 2021-12-15 | End: 2021-12-16 | Stop reason: HOSPADM

## 2021-12-15 RX ORDER — FINASTERIDE 5 MG/1
5 TABLET, FILM COATED ORAL DAILY
Status: DISCONTINUED | OUTPATIENT
Start: 2021-12-15 | End: 2021-12-16 | Stop reason: HOSPADM

## 2021-12-15 RX ORDER — GENTAMICIN SULFATE 80 MG/100ML
80 INJECTION, SOLUTION INTRAVENOUS EVERY 24 HOURS
Status: DISCONTINUED | OUTPATIENT
Start: 2021-12-15 | End: 2021-12-16 | Stop reason: HOSPADM

## 2021-12-15 RX ORDER — CIPROFLOXACIN 500 MG/1
500 TABLET, FILM COATED ORAL 2 TIMES DAILY
Qty: 6 TABLET | Refills: 0 | Status: SHIPPED | OUTPATIENT
Start: 2021-12-15

## 2021-12-15 RX ORDER — NALOXONE HCL 0.4 MG/ML
0.1 VIAL (ML) INJECTION
Status: DISCONTINUED | OUTPATIENT
Start: 2021-12-15 | End: 2021-12-16 | Stop reason: HOSPADM

## 2021-12-15 RX ORDER — IPRATROPIUM BROMIDE AND ALBUTEROL SULFATE 2.5; .5 MG/3ML; MG/3ML
3 SOLUTION RESPIRATORY (INHALATION) ONCE AS NEEDED
Status: DISCONTINUED | OUTPATIENT
Start: 2021-12-15 | End: 2021-12-15 | Stop reason: HOSPADM

## 2021-12-15 RX ORDER — HYDROCODONE BITARTRATE AND ACETAMINOPHEN 10; 325 MG/1; MG/1
1 TABLET ORAL EVERY 4 HOURS PRN
Qty: 12 TABLET | Refills: 0 | Status: SHIPPED | OUTPATIENT
Start: 2021-12-15

## 2021-12-15 RX ORDER — PROPOFOL 10 MG/ML
VIAL (ML) INTRAVENOUS AS NEEDED
Status: DISCONTINUED | OUTPATIENT
Start: 2021-12-15 | End: 2021-12-15 | Stop reason: SURG

## 2021-12-15 RX ORDER — OXYCODONE AND ACETAMINOPHEN 10; 325 MG/1; MG/1
1 TABLET ORAL EVERY 4 HOURS PRN
Status: DISCONTINUED | OUTPATIENT
Start: 2021-12-15 | End: 2021-12-16 | Stop reason: HOSPADM

## 2021-12-15 RX ORDER — CHLORAL HYDRATE 500 MG
1000 CAPSULE ORAL
COMMUNITY

## 2021-12-15 RX ORDER — DROPERIDOL 2.5 MG/ML
0.62 INJECTION, SOLUTION INTRAMUSCULAR; INTRAVENOUS ONCE AS NEEDED
Status: COMPLETED | OUTPATIENT
Start: 2021-12-15 | End: 2021-12-15

## 2021-12-15 RX ORDER — ONDANSETRON 2 MG/ML
INJECTION INTRAMUSCULAR; INTRAVENOUS AS NEEDED
Status: DISCONTINUED | OUTPATIENT
Start: 2021-12-15 | End: 2021-12-15 | Stop reason: SURG

## 2021-12-15 RX ORDER — SODIUM CHLORIDE, SODIUM LACTATE, POTASSIUM CHLORIDE, CALCIUM CHLORIDE 600; 310; 30; 20 MG/100ML; MG/100ML; MG/100ML; MG/100ML
125 INJECTION, SOLUTION INTRAVENOUS ONCE
Status: COMPLETED | OUTPATIENT
Start: 2021-12-15 | End: 2021-12-15

## 2021-12-15 RX ORDER — TAMSULOSIN HYDROCHLORIDE 0.4 MG/1
0.4 CAPSULE ORAL NIGHTLY
Status: DISCONTINUED | OUTPATIENT
Start: 2021-12-15 | End: 2021-12-16 | Stop reason: HOSPADM

## 2021-12-15 RX ORDER — HYDROCHLOROTHIAZIDE 25 MG/1
25 TABLET ORAL DAILY
Status: DISCONTINUED | OUTPATIENT
Start: 2021-12-15 | End: 2021-12-16 | Stop reason: HOSPADM

## 2021-12-15 RX ORDER — GENTAMICIN SULFATE 80 MG/100ML
80 INJECTION, SOLUTION INTRAVENOUS ONCE
Status: COMPLETED | OUTPATIENT
Start: 2021-12-15 | End: 2021-12-15

## 2021-12-15 RX ORDER — FENTANYL CITRATE 50 UG/ML
INJECTION, SOLUTION INTRAMUSCULAR; INTRAVENOUS AS NEEDED
Status: DISCONTINUED | OUTPATIENT
Start: 2021-12-15 | End: 2021-12-15 | Stop reason: SURG

## 2021-12-15 RX ORDER — OXYCODONE HYDROCHLORIDE AND ACETAMINOPHEN 5; 325 MG/1; MG/1
1 TABLET ORAL ONCE AS NEEDED
Status: COMPLETED | OUTPATIENT
Start: 2021-12-15 | End: 2021-12-15

## 2021-12-15 RX ORDER — SODIUM CHLORIDE, SODIUM LACTATE, POTASSIUM CHLORIDE, CALCIUM CHLORIDE 600; 310; 30; 20 MG/100ML; MG/100ML; MG/100ML; MG/100ML
100 INJECTION, SOLUTION INTRAVENOUS ONCE AS NEEDED
Status: DISCONTINUED | OUTPATIENT
Start: 2021-12-15 | End: 2021-12-15 | Stop reason: HOSPADM

## 2021-12-15 RX ORDER — METOPROLOL TARTRATE 5 MG/5ML
INJECTION INTRAVENOUS AS NEEDED
Status: DISCONTINUED | OUTPATIENT
Start: 2021-12-15 | End: 2021-12-15 | Stop reason: SURG

## 2021-12-15 RX ORDER — MEPERIDINE HYDROCHLORIDE 25 MG/ML
12.5 INJECTION INTRAMUSCULAR; INTRAVENOUS; SUBCUTANEOUS
Status: DISCONTINUED | OUTPATIENT
Start: 2021-12-15 | End: 2021-12-15 | Stop reason: HOSPADM

## 2021-12-15 RX ORDER — FENTANYL CITRATE 50 UG/ML
50 INJECTION, SOLUTION INTRAMUSCULAR; INTRAVENOUS
Status: DISCONTINUED | OUTPATIENT
Start: 2021-12-15 | End: 2021-12-15 | Stop reason: HOSPADM

## 2021-12-15 RX ORDER — OMEGA-3S/DHA/EPA/FISH OIL/D3 300MG-1000
1000 CAPSULE ORAL DAILY
Status: DISCONTINUED | OUTPATIENT
Start: 2021-12-15 | End: 2021-12-16 | Stop reason: HOSPADM

## 2021-12-15 RX ORDER — MIDAZOLAM HYDROCHLORIDE 1 MG/ML
0.5 INJECTION INTRAMUSCULAR; INTRAVENOUS
Status: DISCONTINUED | OUTPATIENT
Start: 2021-12-15 | End: 2021-12-15 | Stop reason: HOSPADM

## 2021-12-15 RX ORDER — LISINOPRIL 10 MG/1
20 TABLET ORAL DAILY
Status: DISCONTINUED | OUTPATIENT
Start: 2021-12-15 | End: 2021-12-16 | Stop reason: HOSPADM

## 2021-12-15 RX ORDER — SODIUM CHLORIDE AND POTASSIUM CHLORIDE 150; 900 MG/100ML; MG/100ML
100 INJECTION, SOLUTION INTRAVENOUS CONTINUOUS
Status: DISCONTINUED | OUTPATIENT
Start: 2021-12-15 | End: 2021-12-16 | Stop reason: HOSPADM

## 2021-12-15 RX ADMIN — MEPERIDINE HYDROCHLORIDE 12.5 MG: 25 INJECTION INTRAMUSCULAR; INTRAVENOUS; SUBCUTANEOUS at 11:30

## 2021-12-15 RX ADMIN — GENTAMICIN SULFATE 80 MG: 80 INJECTION, SOLUTION INTRAVENOUS at 10:18

## 2021-12-15 RX ADMIN — KETOROLAC TROMETHAMINE 15 MG: 30 INJECTION, SOLUTION INTRAMUSCULAR; INTRAVENOUS at 12:08

## 2021-12-15 RX ADMIN — ONDANSETRON 4 MG: 2 INJECTION INTRAMUSCULAR; INTRAVENOUS at 10:23

## 2021-12-15 RX ADMIN — HYDROCHLOROTHIAZIDE 25 MG: 25 TABLET ORAL at 18:00

## 2021-12-15 RX ADMIN — SODIUM CHLORIDE AND POTASSIUM CHLORIDE 100 ML/HR: .9; .15 SOLUTION INTRAVENOUS at 19:06

## 2021-12-15 RX ADMIN — FENTANYL CITRATE 50 MCG: 50 INJECTION INTRAMUSCULAR; INTRAVENOUS at 11:22

## 2021-12-15 RX ADMIN — PROPOFOL 50 MG: 10 INJECTION, EMULSION INTRAVENOUS at 10:37

## 2021-12-15 RX ADMIN — FENTANYL CITRATE 50 MCG: 50 INJECTION INTRAMUSCULAR; INTRAVENOUS at 10:35

## 2021-12-15 RX ADMIN — FENTANYL CITRATE 50 MCG: 50 INJECTION INTRAMUSCULAR; INTRAVENOUS at 10:44

## 2021-12-15 RX ADMIN — ONDANSETRON 4 MG: 2 INJECTION INTRAMUSCULAR; INTRAVENOUS at 12:39

## 2021-12-15 RX ADMIN — SODIUM CHLORIDE, POTASSIUM CHLORIDE, SODIUM LACTATE AND CALCIUM CHLORIDE: 600; 310; 30; 20 INJECTION, SOLUTION INTRAVENOUS at 10:18

## 2021-12-15 RX ADMIN — FENTANYL CITRATE 50 MCG: 50 INJECTION INTRAMUSCULAR; INTRAVENOUS at 10:24

## 2021-12-15 RX ADMIN — HYDRALAZINE HYDROCHLORIDE 5 MG: 20 INJECTION INTRAMUSCULAR; INTRAVENOUS at 10:52

## 2021-12-15 RX ADMIN — SODIUM CHLORIDE, POTASSIUM CHLORIDE, SODIUM LACTATE AND CALCIUM CHLORIDE: 600; 310; 30; 20 INJECTION, SOLUTION INTRAVENOUS at 11:07

## 2021-12-15 RX ADMIN — GENTAMICIN SULFATE 80 MG: 80 INJECTION, SOLUTION INTRAVENOUS at 20:01

## 2021-12-15 RX ADMIN — CHOLECALCIFEROL TAB 10 MCG (400 UNIT) 1000 UNITS: 10 TAB at 18:00

## 2021-12-15 RX ADMIN — METOPROLOL TARTRATE 3 MG: 1 INJECTION, SOLUTION INTRAVENOUS at 10:44

## 2021-12-15 RX ADMIN — FENTANYL CITRATE 50 MCG: 50 INJECTION INTRAMUSCULAR; INTRAVENOUS at 10:47

## 2021-12-15 RX ADMIN — FAMOTIDINE 20 MG: 10 INJECTION INTRAVENOUS at 10:23

## 2021-12-15 RX ADMIN — LIDOCAINE HYDROCHLORIDE 60 MG: 20 INJECTION, SOLUTION INFILTRATION; PERINEURAL at 10:23

## 2021-12-15 RX ADMIN — FUROSEMIDE 10 MG: 10 INJECTION, SOLUTION INTRAMUSCULAR; INTRAVENOUS at 12:29

## 2021-12-15 RX ADMIN — TAMSULOSIN HYDROCHLORIDE 0.4 MG: 0.4 CAPSULE ORAL at 20:01

## 2021-12-15 RX ADMIN — FINASTERIDE 5 MG: 5 TABLET, FILM COATED ORAL at 18:00

## 2021-12-15 RX ADMIN — SODIUM CHLORIDE, POTASSIUM CHLORIDE, SODIUM LACTATE AND CALCIUM CHLORIDE 125 ML/HR: 600; 310; 30; 20 INJECTION, SOLUTION INTRAVENOUS at 09:05

## 2021-12-15 RX ADMIN — FENTANYL CITRATE 50 MCG: 50 INJECTION INTRAMUSCULAR; INTRAVENOUS at 11:15

## 2021-12-15 RX ADMIN — FENTANYL CITRATE 50 MCG: 50 INJECTION INTRAMUSCULAR; INTRAVENOUS at 10:18

## 2021-12-15 RX ADMIN — PROPOFOL 100 MG: 10 INJECTION, EMULSION INTRAVENOUS at 10:23

## 2021-12-15 RX ADMIN — OXYCODONE HYDROCHLORIDE AND ACETAMINOPHEN 1 TABLET: 5; 325 TABLET ORAL at 11:47

## 2021-12-15 RX ADMIN — FENTANYL CITRATE 50 MCG: 50 INJECTION INTRAMUSCULAR; INTRAVENOUS at 10:37

## 2021-12-15 RX ADMIN — DROPERIDOL 0.62 MG: 2.5 INJECTION, SOLUTION INTRAMUSCULAR; INTRAVENOUS at 14:42

## 2021-12-15 NOTE — ANESTHESIA PROCEDURE NOTES
Airway  Urgency: elective    Date/Time: 12/15/2021 10:24 AM  Airway not difficult    General Information and Staff    Patient location during procedure: OR  Anesthesiologist: Perry Coffey DO  CRNA: Osorio Ramos CRNA    Indications and Patient Condition  Indications for airway management: airway protection    Preoxygenated: yes  MILS maintained throughout  Mask difficulty assessment: 0 - not attempted    Final Airway Details  Final airway type: supraglottic airway      Successful airway: unique  Size 4    Number of attempts at approach: 1  Assessment: lips, teeth, and gum same as pre-op and atraumatic intubation    Additional Comments  Dentition & lips unchanged from preop

## 2021-12-15 NOTE — OP NOTE
CYSTOSCOPY TRANSURETHRAL RESECTION OF PROSTATE  Procedure Note    David Frias  12/15/2021    Pre-op Diagnosis:   BPH with urinary obstruction [N40.1, N13.8]    Post-op Diagnosis:     Post-Op Diagnosis Codes:     * BPH with urinary obstruction [N40.1, N13.8]    Procedure/CPT® Codes:    66-year-old white male presented in urinary retention.  He is here for TURP he has failed voiding trials x2.  Following an extensive informed consent including the risk of anesthesia, bleeding, infection, urinary incontinence etc. he brought the procedure suite and underwent a general anesthetic he was prepped and draped in a sterile fashion.  I anesthetized the urethra with 20 cc of 2% Xylocaine I advanced a 24 Argentine resectoscope he had a significant ventral false passage at the bulbar urethra.  The prostate was enlarged with a large median bar I resected the median bar down to the level of the verumontanum with excellent cleaning out identified both ureteral orifices I cut the bladder neck in the midline and cleaned all bleeding vessels it was an excellent resection no complications were encountered a belladonna and opium suppository was inserted for postop spasm  Procedure(s):  Cystoscopy and transurethral resection of the prostate    Surgeon(s):  Manjit Serrano MD    Anesthesia: see anesthesia record    Staff:   Circulator: Mildred Green RN  Scrub Person: Tracy Barnes LPN; Karrie Mcrae  Vendor Representative: Janis Coyne    Estimated Blood Loss: none  Urine Voided: * No values recorded between 12/15/2021 10:18 AM and 12/15/2021 11:09 AM *    Specimens:                ID Type Source Tests Collected by Time   A (Not marked as sent) :  Tissue Prostate TISSUE PATHOLOGY EXAM Manjit Serrano MD 12/15/2021 1032         Drains: 22 Argentine three-way Oates catheter    Findings: Median bar    Blood: N/A    Complications: None    Grafts and Implants: None    Manjit Serrano MD     Date: 12/15/2021   Time: 12:06 EST

## 2021-12-15 NOTE — ANESTHESIA PREPROCEDURE EVALUATION
Anesthesia Evaluation     Patient summary reviewed and Nursing notes reviewed   no history of anesthetic complications:  NPO Solid Status: > 8 hours  NPO Liquid Status: > 8 hours           Airway   Mallampati: II  TM distance: >3 FB  Neck ROM: full  no difficulty expected  Dental    (+) edentulous    Pulmonary - normal exam    breath sounds clear to auscultation  (+) a smoker Former,   Cardiovascular - normal exam    ECG reviewed  Rhythm: regular  Rate: normal    (+) hypertension, hyperlipidemia,       Neuro/Psych- negative ROS  GI/Hepatic/Renal/Endo - negative ROS     Musculoskeletal (-) negative ROS    Abdominal  - normal exam   Substance History - negative use     OB/GYN negative ob/gyn ROS         Other - negative ROS                         Anesthesia Plan    ASA 2     general     intravenous induction     Anesthetic plan, all risks, benefits, and alternatives have been provided, discussed and informed consent has been obtained with: patient.    Plan discussed with CRNA.

## 2021-12-15 NOTE — PLAN OF CARE
"Goal Outcome Evaluation:              Outcome Summary: Pt new admit from surgery, had a cysto and TURP performed. AOx4, VSS, refused his b/p meds stating they'll \"bottom them to the teens\" no complaints and no s/s of acute distress noted. Lugo cath, IV access, and telemetry monitoring patent and maintained. Pt has CBI to lugo w/ clear/pink urine output. Will continue to monitor.  "

## 2021-12-15 NOTE — ANESTHESIA POSTPROCEDURE EVALUATION
Patient: David Frias    Procedure Summary     Date: 12/15/21 Room / Location: [facility] OR 06 / [facility] OR    Anesthesia Start: 1018 Anesthesia Stop: 1109    Procedure: Cystoscopy and transurethral resection of the prostate (N/A ) Diagnosis:       BPH with urinary obstruction      (BPH with urinary obstruction [N40.1, N13.8])    Surgeons: Manjit Serrano MD Provider: Perry Coffey DO    Anesthesia Type: general ASA Status: 2          Anesthesia Type: general    Vitals  Vitals Value Taken Time   /86 12/15/21 1137   Temp     Pulse 65 12/15/21 1137   Resp 15 12/15/21 1137   SpO2 98 % 12/15/21 1137           Post Anesthesia Care and Evaluation    Patient location during evaluation: PHASE II  Patient participation: complete - patient participated  Level of consciousness: awake and alert  Pain score: 1  Pain management: adequate  Airway patency: patent  Anesthetic complications: No anesthetic complications  PONV Status: controlled  Cardiovascular status: acceptable  Respiratory status: acceptable  Hydration status: acceptable

## 2021-12-16 VITALS
DIASTOLIC BLOOD PRESSURE: 83 MMHG | HEIGHT: 72 IN | HEART RATE: 78 BPM | RESPIRATION RATE: 18 BRPM | SYSTOLIC BLOOD PRESSURE: 167 MMHG | OXYGEN SATURATION: 96 % | WEIGHT: 156.4 LBS | TEMPERATURE: 98.7 F | BODY MASS INDEX: 21.18 KG/M2

## 2021-12-16 PROCEDURE — G0378 HOSPITAL OBSERVATION PER HR: HCPCS

## 2021-12-16 PROCEDURE — 99024 POSTOP FOLLOW-UP VISIT: CPT | Performed by: UROLOGY

## 2021-12-16 PROCEDURE — 25010000002 SODIUM CHLORIDE 0.9 % WITH KCL 20 MEQ 20-0.9 MEQ/L-% SOLUTION: Performed by: UROLOGY

## 2021-12-16 RX ADMIN — OXYCODONE HYDROCHLORIDE AND ACETAMINOPHEN 1 TABLET: 10; 325 TABLET ORAL at 03:57

## 2021-12-16 RX ADMIN — CHOLECALCIFEROL TAB 10 MCG (400 UNIT) 1000 UNITS: 10 TAB at 09:01

## 2021-12-16 RX ADMIN — FINASTERIDE 5 MG: 5 TABLET, FILM COATED ORAL at 09:01

## 2021-12-16 RX ADMIN — LISINOPRIL 20 MG: 10 TABLET ORAL at 09:01

## 2021-12-16 RX ADMIN — SODIUM CHLORIDE AND POTASSIUM CHLORIDE 100 ML/HR: .9; .15 SOLUTION INTRAVENOUS at 05:54

## 2021-12-16 RX ADMIN — HYDROCHLOROTHIAZIDE 25 MG: 25 TABLET ORAL at 09:01

## 2021-12-16 NOTE — NURSING NOTE
Called Dr Delgado to inform him that pt's urine was starting to look bloody again.  stated he was in a procedure but would come see if after he was done and not to discharge him until he sees him

## 2021-12-16 NOTE — PLAN OF CARE
Problem: Adult Inpatient Plan of Care  Goal: Plan of Care Review  Outcome: Ongoing, Progressing  Flowsheets (Taken 12/16/2021 0143)  Outcome Summary: patient with CBI tolerating well  Goal: Patient-Specific Goal (Individualized)  Outcome: Ongoing, Progressing  Goal: Absence of Hospital-Acquired Illness or Injury  Outcome: Ongoing, Progressing  Intervention: Identify and Manage Fall Risk  Recent Flowsheet Documentation  Taken 12/15/2021 2001 by Adair Calderon RN  Safety Promotion/Fall Prevention: safety round/check completed  Goal: Optimal Comfort and Wellbeing  Outcome: Ongoing, Progressing  Goal: Readiness for Transition of Care  Outcome: Ongoing, Progressing     Problem: Hypertension Comorbidity  Goal: Blood Pressure in Desired Range  Outcome: Ongoing, Progressing     Problem: Skin Injury Risk Increased  Goal: Skin Health and Integrity  Outcome: Ongoing, Progressing   Goal Outcome Evaluation:              Outcome Summary: patient with CBI tolerating well

## 2021-12-16 NOTE — DISCHARGE INSTR - APPOINTMENTS
Dr alonzo  will take  patient  as  a  new  patient  and  wife  will call office   and  give information   and  pt  has  an  atp  with  deepa  for  December 17  at  2 pm

## 2021-12-16 NOTE — NURSING NOTE
Pt being discharged home today on room air but going home with f/c. Torie Bowden RN aware discharge is complete.

## 2021-12-16 NOTE — DISCHARGE SUMMARY
Inpatient Discharge Summary      Patient Name: David Frias  : 1955   MRN: [id number]   Admission Date: 12/15/2021  7:54 AM   Date of Discharge: 2021     Discharge Diagnosis: Bladder outlet obstruction     Care Team: Patient Care Team:  Macy Billings MD as PCP - General (Geriatric Medicine)    History of Present Illness: David Frias is a 66 y.o. male who is being discharged today.      Hospital Course: Mr. Frias is a 66-year-old gentleman who underwent transurethral resection of the prostate with Dr. Serrano on December 15, 2021.  He was admitted overnight for continuous bladder irrigation.  He was seen this morning with clear urine.  His catheter was clamped for several hours and he remained clear.  He was discharged home today with his Oates catheter.  He will follow-up tomorrow 2021 at 2 PM for catheter removal.    Discharge Medications:      Discharge Medications      New Medications      Instructions Start Date   ciprofloxacin 500 MG tablet  Commonly known as: Cipro   500 mg, Oral, 2 Times Daily      HYDROcodone-acetaminophen  MG per tablet  Commonly known as: NORCO   Take 1 tablet by mouth Every 4 (Four) Hours As Needed for Moderate Pain.         Continue These Medications      Instructions Start Date   cholecalciferol 25 MCG (1000 UT) tablet  Commonly known as: VITAMIN D3   1,000 Units, Oral, Daily      finasteride 5 MG tablet  Commonly known as: PROSCAR   5 mg, Oral, Daily      fish oil 1000 MG capsule capsule   1,000 mg, Oral, Daily With Breakfast      JOINT/BONE VITALITY PO   1 tablet, Oral, Daily      Lecithin-Kelp-B6-Cider Vinegar 200-0.075-5-50 MG capsule   1 capsule, Oral, Daily      lisinopril 20 MG tablet  Commonly known as: PRINIVIL,ZESTRIL   20 mg, Oral, Daily      tamsulosin 0.4 MG capsule 24 hr capsule  Commonly known as: Flomax   0.4 mg, Oral, Nightly             Allergies:   No Known Allergies    Physical Exam:   Vital Signs:   Vitals:    21 0333  12/16/21 0500 12/16/21 0604 12/16/21 1030   BP: 147/80  155/87 170/83   BP Location: Left arm  Left arm Left arm   Patient Position: Lying  Lying Lying   Pulse: 85  87 65   Resp: 18  18 18   Temp: 97.2 °F (36.2 °C)  97.8 °F (36.6 °C) 98.7 °F (37.1 °C)   TempSrc: Oral  Axillary Oral   SpO2: 97%  95% 92%   Weight:  70.9 kg (156 lb 6.4 oz)  Comment: admit weight, pt not on floor 24 hours     Height:         Body mass index is 21.42 kg/m².     Physical Exam  Vitals and nursing note reviewed.   Constitutional:       General: He is awake. He is not in acute distress.     Appearance: Normal appearance. He is well-developed. He is obese.   HENT:      Head: Normocephalic and atraumatic.      Right Ear: External ear normal.      Left Ear: External ear normal.      Nose: Nose normal.   Eyes:      Conjunctiva/sclera: Conjunctivae normal.   Pulmonary:      Effort: Pulmonary effort is normal.   Abdominal:      General: There is no distension.      Palpations: Abdomen is soft. There is no mass.      Tenderness: There is no abdominal tenderness. There is no right CVA tenderness, left CVA tenderness, guarding or rebound.      Hernia: No hernia is present. There is no hernia in the left inguinal area or right inguinal area.   Genitourinary:     Pubic Area: No rash.       Penis: Normal.       Testes: Normal.      Rectum: No mass or tenderness. Normal anal tone.      Comments: 3-way lugo catheter in place with clear urine   Musculoskeletal:      Cervical back: Normal range of motion.   Lymphadenopathy:      Lower Body: No right inguinal adenopathy. No left inguinal adenopathy.   Skin:     General: Skin is warm.   Neurological:      General: No focal deficit present.      Mental Status: He is alert and oriented to person, place, and time.   Psychiatric:         Behavior: Behavior normal. Behavior is cooperative.         Pertinent Test Results:   Lab Results (all)     Procedure Component Value Units Date/Time    Tissue Pathology Exam  [[id number]] Collected: 12/15/21 1032    Specimen: Tissue from Prostate Updated: 12/15/21 1245          Imaging Results (All)     Procedure Component Value Units Date/Time    DEON oneill endo (surgery) [[id number]] Resulted: 12/15/21 1005     Updated: 12/15/21 1005    Narrative:      This procedure was auto-finalized with no dictation required.          Discharge Disposition:    Home or Self Care    Discharge Diet:     Diet Instructions     Advance Diet as Tolerated      Advance Diet as Tolerated            Activity at Discharge:     Activity Instructions     Discharge Activity      1) No driving for 3 days and no longer taking narcotics.   2) Return to school / work in 3 days.  3) May shower / sponge bathe todya.  4) Do not lift / push / pull more then 25 lbs.          1.     Follow Up:   Future Appointments   Date Time Provider Department Center   12/17/2021  2:00 PM Manjit Serrano MD [facility]     Additional Instructions for the Follow-ups that You Need to Schedule     Discharge Follow-up with Specified Provider: Dr. Serrano for catheter removal; 1 Day   As directed      To: Dr. Serrano for catheter removal    Follow Up: 1 Day    Follow Up Details: Catheter removal tomorrow at 2 pm.         Discharge Follow-up with Specified Provider: Friday, December 17 at 2 PM for catheter removal with Zach   As directed      To: Friday, December 17 at 2 PM for catheter removal with Zach               Test Results Pending at Discharge:    Pending Labs     Order Current Status    Tissue Pathology Exam In process          Kavon Delgado MD   [facility] [place]  12/16/21  11:18 EST

## 2021-12-17 ENCOUNTER — OFFICE VISIT (OUTPATIENT)
Dept: UROLOGY | Facility: CLINIC | Age: 66
End: 2021-12-17

## 2021-12-17 VITALS — HEIGHT: 72 IN | BODY MASS INDEX: 21.17 KG/M2 | WEIGHT: 156.31 LBS

## 2021-12-17 DIAGNOSIS — C61 PROSTATE CANCER (HCC): Primary | ICD-10-CM

## 2021-12-17 LAB
LAB AP CASE REPORT: NORMAL
PATH REPORT.FINAL DX SPEC: NORMAL

## 2021-12-17 PROCEDURE — 99024 POSTOP FOLLOW-UP VISIT: CPT | Performed by: UROLOGY

## 2021-12-17 NOTE — PROGRESS NOTES
Chief Complaint:          Chief Complaint   Patient presents with   • Post-op Follow-up       HPI:   66 y.o. male returns today.  He had a TURP with a positive pathology.  Attempted to discuss it today.  Nonetheless we will give him a voiding trial we will see him back based on this  He has an aggressive T2 lesion Romulo 9 he will need a staging work-up    Past Medical History:        Past Medical History:   Diagnosis Date   • Elevated cholesterol    • Hypertension     but refuses to take medication as he had a drop in blood pressure once         Current Meds:     Current Outpatient Medications   Medication Sig Dispense Refill   • cholecalciferol (VITAMIN D3) 1000 units tablet Take 1,000 Units by mouth Daily.     • ciprofloxacin (Cipro) 500 MG tablet Take 1 tablet by mouth 2 (Two) Times a Day. 6 tablet 0   • finasteride (PROSCAR) 5 MG tablet Take 1 tablet by mouth Daily. 30 tablet 5   • HYDROcodone-acetaminophen (NORCO)  MG per tablet Take 1 tablet by mouth Every 4 (Four) Hours As Needed for Moderate Pain. 12 tablet 0   • Lecithin-I-B6-Cider Vinegar (Lecithin-Kelp-B6-Cider Vinegar) 200-0.075-5-50 MG capsule Take 1 capsule by mouth Daily.     • lisinopril (PRINIVIL,ZESTRIL) 20 MG tablet Take 20 mg by mouth Daily.  2   • Omega-3 Fatty Acids (fish oil) 1000 MG capsule capsule Take 1,000 mg by mouth Daily With Breakfast.     • Specialty Vitamins Products (JOINT/BONE VITALITY PO) Take 1 tablet by mouth Daily.     • tamsulosin (Flomax) 0.4 MG capsule 24 hr capsule Take 1 capsule by mouth Every Night. 30 capsule 5     No current facility-administered medications for this visit.        Allergies:      No Known Allergies     Past Surgical History:     Past Surgical History:   Procedure Laterality Date   • ABDOMINAL SURGERY     • CHOLECYSTECTOMY     • CHOLECYSTECTOMY WITH INTRAOPERATIVE CHOLANGIOGRAM N/A 8/3/2016    Procedure: CHOLECYSTECTOMY LAPAROSCOPIC INTRAOPERATIVE CHOLANGIOGRAM;  Surgeon: Garrett Barnes MD;   Location: [facility] OR;  Service:    • CYSTOSCOPY TRANSURETHRAL RESECTION OF PROSTATE N/A 12/15/2021    Procedure: Cystoscopy and transurethral resection of the prostate;  Surgeon: Manjit Serrano MD;  Location: [facility] OR;  Service: Urology;  Laterality: N/A;   • FRACTURE SURGERY  2015    ORIF RIGHT LEG    • HAND SURGERY Right     SECOND DIGIT AT FIRST AND SECOND JOINT SURGICAL REPAIR RELATED TO INJURY   • LEG SURGERY           Social History:     Social History     Socioeconomic History   • Marital status:    Tobacco Use   • Smoking status: Former Smoker     Packs/day: 1.00     Years: 15.00     Pack years: 15.00     Types: Cigarettes   • Smokeless tobacco: Never Used   • Tobacco comment: STOPPED SMOKING 10-12 YEARS AGO   Vaping Use   • Vaping Use: Never used   Substance and Sexual Activity   • Alcohol use: No   • Drug use: No   • Sexual activity: Defer       Family History:     Family History   Problem Relation Age of Onset   • Heart disease Paternal Grandmother    • Hypertension Sister    • Cancer Sister    • Depression Brother        Review of Systems:     Review of Systems   Constitutional: Negative.    HENT: Negative.    Eyes: Negative.    Respiratory: Negative.    Cardiovascular: Negative.    Gastrointestinal: Negative.    Endocrine: Negative.    Genitourinary: Positive for difficulty urinating.   Musculoskeletal: Negative.    Allergic/Immunologic: Negative.    Neurological: Negative.    Hematological: Negative.    Psychiatric/Behavioral: Negative.        Physical Exam:     Physical Exam  Vitals and nursing note reviewed.   Constitutional:       Appearance: He is well-developed.   HENT:      Head: Normocephalic and atraumatic.   Eyes:      Conjunctiva/sclera: Conjunctivae normal.      Pupils: Pupils are equal, round, and reactive to light.   Cardiovascular:      Rate and Rhythm: Normal rate and regular rhythm.      Heart sounds: Normal heart sounds.   Pulmonary:      Effort: Pulmonary effort is  normal.      Breath sounds: Normal breath sounds.   Abdominal:      General: Bowel sounds are normal.      Palpations: Abdomen is soft.   Musculoskeletal:         General: Normal range of motion.      Cervical back: Normal range of motion.   Skin:     General: Skin is warm and dry.   Neurological:      Mental Status: He is alert and oriented to person, place, and time.      Deep Tendon Reflexes: Reflexes are normal and symmetric.   Psychiatric:         Behavior: Behavior normal.         Thought Content: Thought content normal.         Judgment: Judgment normal.         I have reviewed the following portions of the patient's history: Allergies, current medications, past family history, past medical history, past social history, past surgical history, problem list, and ROS and confirm it is accurate.      Procedure:       Assessment/Plan:   Urinary retention-he had severe at retention he has had several voiding trials unsuccessful he had a TURP with at least 60% of the chips positive for [place] 9 lesion his last PSA on record was normal at 3.4.  It was not checked prior to the procedure because the catheter was causing extreme inflammation.  Nonetheless, he will need a postop staging work-up this is an aggressive T2 lesion.                This document has been electronically signed by LISA GREY MD December 17, 2021 14:04 EST

## 2021-12-18 ENCOUNTER — APPOINTMENT (OUTPATIENT)
Dept: CT IMAGING | Facility: HOSPITAL | Age: 66
End: 2021-12-18

## 2021-12-18 ENCOUNTER — NURSE TRIAGE (OUTPATIENT)
Dept: CALL CENTER | Facility: HOSPITAL | Age: 66
End: 2021-12-18

## 2021-12-18 ENCOUNTER — HOSPITAL ENCOUNTER (EMERGENCY)
Facility: HOSPITAL | Age: 66
Discharge: HOME | End: 2021-12-18
Attending: EMERGENCY MEDICINE | Admitting: EMERGENCY MEDICINE

## 2021-12-18 VITALS
RESPIRATION RATE: 18 BRPM | WEIGHT: 155 LBS | TEMPERATURE: 97.8 F | BODY MASS INDEX: 20.99 KG/M2 | DIASTOLIC BLOOD PRESSURE: 106 MMHG | OXYGEN SATURATION: 98 % | HEART RATE: 110 BPM | SYSTOLIC BLOOD PRESSURE: 183 MMHG | HEIGHT: 72 IN

## 2021-12-18 DIAGNOSIS — R33.9 URINARY RETENTION: Primary | ICD-10-CM

## 2021-12-18 LAB
ALBUMIN SERPL-MCNC: 4.07 G/DL (ref 3.5–5.2)
ALBUMIN/GLOB SERPL: 1.3 G/DL
ALP SERPL-CCNC: 85 U/L (ref 39–117)
ALT SERPL W P-5'-P-CCNC: 23 U/L (ref 1–41)
ANION GAP SERPL CALCULATED.3IONS-SCNC: 10.9 MMOL/L (ref 5–15)
AST SERPL-CCNC: 20 U/L (ref 1–40)
BACTERIA UR QL AUTO: ABNORMAL /HPF
BASOPHILS # BLD AUTO: 0.05 10*3/MM3 (ref 0–0.2)
BASOPHILS NFR BLD AUTO: 0.3 % (ref 0–1.5)
BILIRUB SERPL-MCNC: 0.8 MG/DL (ref 0–1.2)
BILIRUB UR QL STRIP: ABNORMAL
BUN SERPL-MCNC: 16 MG/DL (ref 8–23)
BUN/CREAT SERPL: 15 (ref 7–25)
CALCIUM SPEC-SCNC: 9.5 MG/DL (ref 8.6–10.5)
CHLORIDE SERPL-SCNC: 97 MMOL/L (ref 98–107)
CLARITY UR: ABNORMAL
CO2 SERPL-SCNC: 26.1 MMOL/L (ref 22–29)
COLOR UR: ABNORMAL
CREAT SERPL-MCNC: 1.07 MG/DL (ref 0.76–1.27)
CRP SERPL-MCNC: 2.63 MG/DL (ref 0–0.5)
D-LACTATE SERPL-SCNC: 1.6 MMOL/L (ref 0.5–2)
DEPRECATED RDW RBC AUTO: 45.2 FL (ref 37–54)
EOSINOPHIL # BLD AUTO: 0.42 10*3/MM3 (ref 0–0.4)
EOSINOPHIL NFR BLD AUTO: 2.3 % (ref 0.3–6.2)
ERYTHROCYTE [DISTWIDTH] IN BLOOD BY AUTOMATED COUNT: 11.9 % (ref 12.3–15.4)
GFR SERPL CREATININE-BSD FRML MDRD: 69 ML/MIN/1.73
GLOBULIN UR ELPH-MCNC: 3.2 GM/DL
GLUCOSE SERPL-MCNC: 138 MG/DL (ref 65–99)
GLUCOSE UR STRIP-MCNC: NEGATIVE MG/DL
HCT VFR BLD AUTO: 44.7 % (ref 37.5–51)
HGB BLD-MCNC: 15.3 G/DL (ref 13–17.7)
HGB UR QL STRIP.AUTO: ABNORMAL
HYALINE CASTS UR QL AUTO: ABNORMAL /LPF
IMM GRANULOCYTES # BLD AUTO: 0.06 10*3/MM3 (ref 0–0.05)
IMM GRANULOCYTES NFR BLD AUTO: 0.3 % (ref 0–0.5)
KETONES UR QL STRIP: NEGATIVE
LEUKOCYTE ESTERASE UR QL STRIP.AUTO: ABNORMAL
LYMPHOCYTES # BLD AUTO: 1.58 10*3/MM3 (ref 0.7–3.1)
LYMPHOCYTES NFR BLD AUTO: 8.5 % (ref 19.6–45.3)
MCH RBC QN AUTO: 34.7 PG (ref 26.6–33)
MCHC RBC AUTO-ENTMCNC: 34.2 G/DL (ref 31.5–35.7)
MCV RBC AUTO: 101.4 FL (ref 79–97)
MONOCYTES # BLD AUTO: 1.53 10*3/MM3 (ref 0.1–0.9)
MONOCYTES NFR BLD AUTO: 8.3 % (ref 5–12)
NEUTROPHILS NFR BLD AUTO: 14.89 10*3/MM3 (ref 1.7–7)
NEUTROPHILS NFR BLD AUTO: 80.3 % (ref 42.7–76)
NITRITE UR QL STRIP: NEGATIVE
NRBC BLD AUTO-RTO: 0 /100 WBC (ref 0–0.2)
PH UR STRIP.AUTO: 6 [PH] (ref 5–8)
PLATELET # BLD AUTO: 261 10*3/MM3 (ref 140–450)
PMV BLD AUTO: 10.3 FL (ref 6–12)
POTASSIUM SERPL-SCNC: 3.5 MMOL/L (ref 3.5–5.2)
PROT SERPL-MCNC: 7.3 G/DL (ref 6–8.5)
PROT UR QL STRIP: ABNORMAL
RBC # BLD AUTO: 4.41 10*6/MM3 (ref 4.14–5.8)
RBC # UR STRIP: ABNORMAL /HPF
REF LAB TEST METHOD: ABNORMAL
SODIUM SERPL-SCNC: 134 MMOL/L (ref 136–145)
SP GR UR STRIP: 1.01 (ref 1–1.03)
SQUAMOUS #/AREA URNS HPF: ABNORMAL /HPF
UROBILINOGEN UR QL STRIP: ABNORMAL
WBC # UR STRIP: ABNORMAL /HPF
WBC NRBC COR # BLD: 18.53 10*3/MM3 (ref 3.4–10.8)

## 2021-12-18 PROCEDURE — 81001 URINALYSIS AUTO W/SCOPE: CPT | Performed by: NURSE PRACTITIONER

## 2021-12-18 PROCEDURE — 87040 BLOOD CULTURE FOR BACTERIA: CPT | Performed by: NURSE PRACTITIONER

## 2021-12-18 PROCEDURE — 25010000002 CEFTRIAXONE PER 250 MG: Performed by: PHYSICIAN ASSISTANT

## 2021-12-18 PROCEDURE — 99283 EMERGENCY DEPT VISIT LOW MDM: CPT

## 2021-12-18 PROCEDURE — 80053 COMPREHEN METABOLIC PANEL: CPT | Performed by: NURSE PRACTITIONER

## 2021-12-18 PROCEDURE — 36415 COLL VENOUS BLD VENIPUNCTURE: CPT

## 2021-12-18 PROCEDURE — 85025 COMPLETE CBC W/AUTO DIFF WBC: CPT | Performed by: NURSE PRACTITIONER

## 2021-12-18 PROCEDURE — 87086 URINE CULTURE/COLONY COUNT: CPT | Performed by: NURSE PRACTITIONER

## 2021-12-18 PROCEDURE — 74176 CT ABD & PELVIS W/O CONTRAST: CPT

## 2021-12-18 PROCEDURE — 86140 C-REACTIVE PROTEIN: CPT | Performed by: NURSE PRACTITIONER

## 2021-12-18 PROCEDURE — 96372 THER/PROPH/DIAG INJ SC/IM: CPT

## 2021-12-18 PROCEDURE — 83605 ASSAY OF LACTIC ACID: CPT | Performed by: NURSE PRACTITIONER

## 2021-12-18 RX ORDER — CEFDINIR 300 MG/1
300 CAPSULE ORAL 2 TIMES DAILY
Qty: 14 CAPSULE | Refills: 0 | Status: SHIPPED | OUTPATIENT
Start: 2021-12-18 | End: 2021-12-25

## 2021-12-18 RX ORDER — HYDROCODONE BITARTRATE AND ACETAMINOPHEN 7.5; 325 MG/1; MG/1
1 TABLET ORAL ONCE
Status: COMPLETED | OUTPATIENT
Start: 2021-12-18 | End: 2021-12-18

## 2021-12-18 RX ADMIN — LIDOCAINE HYDROCHLORIDE 1 G: 10 INJECTION, SOLUTION EPIDURAL; INFILTRATION; INTRACAUDAL; PERINEURAL at 21:30

## 2021-12-18 RX ADMIN — HYDROCODONE BITARTRATE AND ACETAMINOPHEN 1 TABLET: 7.5; 325 TABLET ORAL at 22:09

## 2021-12-18 NOTE — ED NOTES
MEDICAL SCREENING:    Reason for Visit: Difficulty urinating      Patient initially seen in triage.  The patient was advised further evaluation and diagnostic testing will be needed, some of the treatment and testing will be initiated in the lobby in order to begin the process.  The patient will be returned to the waiting area for the time being and possibly be re-assessed by a subsequent ED provider.  The patient will be brought back to the treatment area in as timely manner as possible.         Cristhian Hill, APRN  12/18/21 163

## 2021-12-18 NOTE — TELEPHONE ENCOUNTER
"He had urinary catheter removed yesterday and today cannot pass urine, did pass some blood.      Reason for Disposition  • [1] Unable to urinate (or only a few drops) > 4 hours AND [2] bladder feels very full (e.g., palpable bladder or strong urge to urinate)    Additional Information  • Negative: Shock suspected (e.g., cold/pale/clammy skin, too weak to stand, low BP, rapid pulse)  • Negative: Sounds like a life-threatening emergency to the triager  • Negative: Followed a female genital area injury (e.g., vagina, vulva)  • Negative: Followed a male genital area injury (e.g., penis, scrotum)  • Negative: Vaginal discharge  • Negative: Pus (white, yellow) or bloody discharge from end of penis  • Negative: [1] Taking antibiotic for urinary tract infection (UTI) AND [2] female  • Negative: [1] Taking antibiotic for urinary tract infection (UTI) AND [2] male  • Negative: [1] Discomfort (pain, burning or stinging) when passing urine AND [2] pregnant  • Negative: [1] Discomfort (pain, burning or stinging) when passing urine AND [2] postpartum (from 0 to 6 weeks after delivery)  • Negative: [1] Discomfort (pain, burning or stinging) when passing urine AND [2] female  • Negative: [1] Discomfort (pain, burning or stinging) when passing urine AND [2] male  • Negative: Pain or itching in the vulvar area  • Negative: Pain in scrotum is main symptom  • Negative: Blood in the urine is main symptom  • Negative: Symptoms arising from use of a urinary catheter (Oates or Coude)  • Negative: [1] Decreased urination and [2] drinking very little AND [2] dehydration suspected (e.g., dark urine, no urine > 12 hours, very dry mouth, very lightheaded)    Answer Assessment - Initial Assessment Questions  1. SYMPTOM: \"What's the main symptom you're concerned about?\" (e.g., frequency, incontinence)      Catheter was removed yesterday and cannot pass urine.  Did pass a little blood.    2. ONSET: \"When did the  Problem   start?\"      Today    3. " "PAIN: \"Is there any pain?\" If Yes, ask: \"How bad is it?\" (Scale: 1-10; mild, moderate, severe)      Unknown   4. CAUSE: \"What do you think is causing the symptoms?\"      Unknown    5. OTHER SYMPTOMS: \"Do you have any other symptoms?\" (e.g., fever, flank pain, blood in urine, pain with urination)      Blood in urine    6. PREGNANCY: \"Is there any chance you are pregnant?\" \"When was your last menstrual period?\"      Male    Protocols used: URINARY SYMPTOMS-ADULT-      "

## 2021-12-19 NOTE — ED PROVIDER NOTES
Subjective   Patient is a 66-year-old male with hypertension and hyperlipidemia that presents to the ED with complaints of difficulty urinating.  He states he just underwent surgery with Dr. Wilson on 1215.  He then had his Oates catheter removed yesterday in the clinic.  He states since having it removed he has been unable to urinate.  He states he is having a lot of lower abdominal pain and pressure.  He denies chest pain, shortness of breath, fever, chills, nausea, vomiting, headache, dizziness, diarrhea, or constipation.      History provided by:  Patient   used: No        Review of Systems   Constitutional: Negative.  Negative for chills, diaphoresis, fatigue and fever.   HENT: Negative.  Negative for congestion, drooling, ear discharge, ear pain, facial swelling, hearing loss, rhinorrhea, sinus pressure, sinus pain, sneezing, sore throat, tinnitus and trouble swallowing.    Eyes: Negative.  Negative for photophobia, pain, discharge, redness and itching.   Respiratory: Negative.  Negative for cough, shortness of breath and wheezing.    Cardiovascular: Negative.  Negative for chest pain.   Gastrointestinal: Negative.  Negative for abdominal distention, abdominal pain, constipation, diarrhea, nausea and vomiting.   Endocrine: Negative.    Genitourinary: Positive for decreased urine volume and difficulty urinating. Negative for dysuria, flank pain and frequency.   Musculoskeletal: Negative.  Negative for arthralgias, back pain, gait problem, joint swelling, myalgias, neck pain and neck stiffness.   Skin: Negative.    Neurological: Negative.  Negative for dizziness, tremors, seizures, syncope, facial asymmetry, speech difficulty, weakness, light-headedness, numbness and headaches.   Psychiatric/Behavioral: Negative.  Negative for confusion.   All other systems reviewed and are negative.      Past Medical History:   Diagnosis Date   • Elevated cholesterol    • Hypertension     but refuses to  take medication as he had a drop in blood pressure once       No Known Allergies    Past Surgical History:   Procedure Laterality Date   • ABDOMINAL SURGERY     • CHOLECYSTECTOMY     • CHOLECYSTECTOMY WITH INTRAOPERATIVE CHOLANGIOGRAM N/A 8/3/2016    Procedure: CHOLECYSTECTOMY LAPAROSCOPIC INTRAOPERATIVE CHOLANGIOGRAM;  Surgeon: Garrett Barnes MD;  Location: [facility];  Service:    • CYSTOSCOPY TRANSURETHRAL RESECTION OF PROSTATE N/A 12/15/2021    Procedure: Cystoscopy and transurethral resection of the prostate;  Surgeon: Manjit Serrano MD;  Location: [facility];  Service: Urology;  Laterality: N/A;   • FRACTURE SURGERY  2015    ORIF RIGHT LEG    • HAND SURGERY Right     SECOND DIGIT AT FIRST AND SECOND JOINT SURGICAL REPAIR RELATED TO INJURY   • LEG SURGERY         Family History   Problem Relation Age of Onset   • Heart disease Paternal Grandmother    • Hypertension Sister    • Cancer Sister    • Depression Brother        Social History     Socioeconomic History   • Marital status:    Tobacco Use   • Smoking status: Former Smoker     Packs/day: 1.00     Years: 15.00     Pack years: 15.00     Types: Cigarettes   • Smokeless tobacco: Never Used   • Tobacco comment: STOPPED SMOKING 10-12 YEARS AGO   Vaping Use   • Vaping Use: Never used   Substance and Sexual Activity   • Alcohol use: No   • Drug use: No   • Sexual activity: Defer           Objective   Physical Exam  Vitals and nursing note reviewed.   Constitutional:       General: He is not in acute distress.     Appearance: He is well-developed. He is not diaphoretic.   HENT:      Head: Normocephalic and atraumatic.      Right Ear: External ear normal.      Left Ear: External ear normal.      Nose: Nose normal.      Mouth/Throat:      Mouth: Mucous membranes are moist.      Pharynx: Oropharynx is clear.   Eyes:      Extraocular Movements: Extraocular movements intact.      Conjunctiva/sclera: Conjunctivae normal.      Pupils: Pupils are equal,  round, and reactive to light.   Neck:      Vascular: No JVD.      Trachea: No tracheal deviation.   Cardiovascular:      Rate and Rhythm: Normal rate and regular rhythm.      Heart sounds: Normal heart sounds. No murmur heard.      Pulmonary:      Effort: Pulmonary effort is normal. No respiratory distress.      Breath sounds: Normal breath sounds. No wheezing.   Abdominal:      General: Bowel sounds are normal. There is no distension.      Palpations: Abdomen is soft.      Tenderness: There is abdominal tenderness in the suprapubic area. There is no right CVA tenderness, left CVA tenderness, guarding or rebound.   Musculoskeletal:         General: No deformity. Normal range of motion.      Cervical back: Normal range of motion and neck supple.      Right lower leg: No edema.      Left lower leg: No edema.   Skin:     General: Skin is warm and dry.      Coloration: Skin is not pale.      Findings: No erythema or rash.   Neurological:      General: No focal deficit present.      Mental Status: He is alert and oriented to person, place, and time.      Cranial Nerves: No cranial nerve deficit.   Psychiatric:         Mood and Affect: Mood normal.         Behavior: Behavior normal.         Thought Content: Thought content normal.         Procedures           ED Course  ED Course as of 12/18/21 2227   Sat Dec 18, 2021   2015 CT Abdomen Pelvis Stone Protocol  IMPRESSION:  1.  Severe bladder outlet obstruction with marked distention of the urinary bladder and moderately severe bilateral hydronephrosis. This should be associated with marked prostate enlargement as detailed above. Bladder volume 1130 cc. Prostate volume 187  cc.  2.  Cholecystectomy.     Signer Name: Ritchie Carl MD   Signed: 12/18/2021 5:33 PM []   2052 Discussed with Dr. Wilson.  He is a well-known patient to him.  He states to anchor a Oates catheter in him and discharged him home on antibiotics with follow-up first thing next week in his  outpatient clinic. []      ED Course User Index  [] Niesha Green PA-C                                                 [facility]    Final diagnoses:   Urinary retention       ED Disposition  ED Disposition     ED Disposition Condition Comment    Discharge Stable           Manjit Serrano MD  [address]  [facility] [address]  [phone]    Schedule an appointment as soon as possible for a visit in 1 day           Medication List      New Prescriptions    cefdinir 300 MG capsule  Commonly known as: OMNICEF  Take 1 capsule by mouth 2 (Two) Times a Day for 7 days.           Where to Get Your Medications      You can get these medications from any pharmacy    Bring a paper prescription for each of these medications  · cefdinir 300 MG capsule          Niesha Green PA-C  12/18/21 4425

## 2021-12-20 LAB — BACTERIA SPEC AEROBE CULT: NORMAL

## 2021-12-23 ENCOUNTER — OFFICE VISIT (OUTPATIENT)
Dept: UROLOGY | Facility: CLINIC | Age: 66
End: 2021-12-23

## 2021-12-23 VITALS — WEIGHT: 155 LBS | HEIGHT: 72 IN | BODY MASS INDEX: 20.99 KG/M2

## 2021-12-23 DIAGNOSIS — C61 PROSTATE CANCER (HCC): Primary | ICD-10-CM

## 2021-12-23 LAB
BACTERIA SPEC AEROBE CULT: NORMAL
BACTERIA SPEC AEROBE CULT: NORMAL

## 2021-12-23 PROCEDURE — 99024 POSTOP FOLLOW-UP VISIT: CPT | Performed by: UROLOGY

## 2021-12-23 RX ORDER — BICALUTAMIDE 50 MG/1
50 TABLET, FILM COATED ORAL DAILY
Qty: 30 TABLET | Refills: 3 | Status: SHIPPED | OUTPATIENT
Start: 2021-12-23 | End: 2022-01-22

## 2021-12-26 PROBLEM — C61 PROSTATE CANCER (HCC): Status: ACTIVE | Noted: 2021-12-26

## 2021-12-26 NOTE — PROGRESS NOTES
Chief Complaint:          Chief Complaint   Patient presents with   • Benign Prostatic Hypertrophy     1 week follow up        HPI:   66 y.o. male who presented in urinary retention had a TURP with high-grade Romulo 9 prostate cancer I discussed this with him at length.  He does not have a great deal of understanding.  I am going to stage him, and then start him on Casodex.  He will need treatment.  I will give him a voiding trial after the TUR is healed.  He states that the reason he could not urinate was because of a blood clot.  I will see him back in approximately 10 days.      Past Medical History:        Past Medical History:   Diagnosis Date   • Elevated cholesterol    • Hypertension     but refuses to take medication as he had a drop in blood pressure once         Current Meds:     Current Outpatient Medications   Medication Sig Dispense Refill   • cholecalciferol (VITAMIN D3) 1000 units tablet Take 1,000 Units by mouth Daily.     • ciprofloxacin (Cipro) 500 MG tablet Take 1 tablet by mouth 2 (Two) Times a Day. 6 tablet 0   • finasteride (PROSCAR) 5 MG tablet Take 1 tablet by mouth Daily. 30 tablet 5   • HYDROcodone-acetaminophen (NORCO)  MG per tablet Take 1 tablet by mouth Every 4 (Four) Hours As Needed for Moderate Pain. 12 tablet 0   • Lecithin-I-B6-Cider Vinegar (Lecithin-Kelp-B6-Cider Vinegar) 200-0.075-5-50 MG capsule Take 1 capsule by mouth Daily.     • lisinopril (PRINIVIL,ZESTRIL) 20 MG tablet Take 20 mg by mouth Daily.  2   • Omega-3 Fatty Acids (fish oil) 1000 MG capsule capsule Take 1,000 mg by mouth Daily With Breakfast.     • Specialty Vitamins Products (JOINT/BONE VITALITY PO) Take 1 tablet by mouth Daily.     • tamsulosin (Flomax) 0.4 MG capsule 24 hr capsule Take 1 capsule by mouth Every Night. 30 capsule 5   • bicalutamide (Casodex) 50 MG chemo tablet Take 1 tablet by mouth Daily for 30 days. 30 tablet 3     No current facility-administered medications for this visit.         Allergies:      No Known Allergies     Past Surgical History:     Past Surgical History:   Procedure Laterality Date   • ABDOMINAL SURGERY     • CHOLECYSTECTOMY     • CHOLECYSTECTOMY WITH INTRAOPERATIVE CHOLANGIOGRAM N/A 8/3/2016    Procedure: CHOLECYSTECTOMY LAPAROSCOPIC INTRAOPERATIVE CHOLANGIOGRAM;  Surgeon: Garrett Barnes MD;  Location: [facility];  Service:    • CYSTOSCOPY TRANSURETHRAL RESECTION OF PROSTATE N/A 12/15/2021    Procedure: Cystoscopy and transurethral resection of the prostate;  Surgeon: Manjit Serrano MD;  Location: [facility];  Service: Urology;  Laterality: N/A;   • FRACTURE SURGERY  2015    ORIF RIGHT LEG    • HAND SURGERY Right     SECOND DIGIT AT FIRST AND SECOND JOINT SURGICAL REPAIR RELATED TO INJURY   • LEG SURGERY           Social History:     Social History     Socioeconomic History   • Marital status:    Tobacco Use   • Smoking status: Former Smoker     Packs/day: 1.00     Years: 15.00     Pack years: 15.00     Types: Cigarettes   • Smokeless tobacco: Never Used   • Tobacco comment: STOPPED SMOKING 10-12 YEARS AGO   Vaping Use   • Vaping Use: Never used   Substance and Sexual Activity   • Alcohol use: No   • Drug use: No   • Sexual activity: Defer       Family History:     Family History   Problem Relation Age of Onset   • Heart disease Paternal Grandmother    • Hypertension Sister    • Cancer Sister    • Depression Brother        Review of Systems:     Review of Systems   Constitutional: Negative.    HENT: Negative.    Eyes: Negative.    Respiratory: Negative.    Cardiovascular: Negative.    Gastrointestinal: Negative.    Endocrine: Negative.    Musculoskeletal: Negative.    Allergic/Immunologic: Negative.    Neurological: Negative.    Hematological: Negative.    Psychiatric/Behavioral: Negative.        Physical Exam:     Physical Exam  Vitals and nursing note reviewed.   Constitutional:       Appearance: He is well-developed.   HENT:      Head: Normocephalic and  atraumatic.   Eyes:      Conjunctiva/sclera: Conjunctivae normal.      Pupils: Pupils are equal, round, and reactive to light.   Cardiovascular:      Rate and Rhythm: Normal rate and regular rhythm.      Heart sounds: Normal heart sounds.   Pulmonary:      Effort: Pulmonary effort is normal.      Breath sounds: Normal breath sounds.   Abdominal:      General: Bowel sounds are normal.      Palpations: Abdomen is soft.   Musculoskeletal:         General: Normal range of motion.      Cervical back: Normal range of motion.   Skin:     General: Skin is warm and dry.   Neurological:      Mental Status: He is alert and oriented to person, place, and time.      Deep Tendon Reflexes: Reflexes are normal and symmetric.   Psychiatric:         Behavior: Behavior normal.         Thought Content: Thought content normal.         Judgment: Judgment normal.         I have reviewed the following portions of the patient's history: Allergies, current medications, past family history, past medical history, past social history, past surgical history, problem list, and ROS and confirm it is accurate.      Procedure:       Assessment/Plan:   Urinary retention-status post TURP with pathology productive of a high Romulo score prostate cancer will initiate a staging work-up with total body bone scan CAT scan we will start him today empirically on Casodex                  This document has been electronically signed by LISA GREY MD December 26, 2021 17:19 EST

## 2022-01-03 ENCOUNTER — HOSPITAL ENCOUNTER (OUTPATIENT)
Dept: GENERAL RADIOLOGY | Facility: HOSPITAL | Age: 67
Discharge: HOME | End: 2022-01-03
Admitting: UROLOGY

## 2022-01-03 ENCOUNTER — OFFICE VISIT (OUTPATIENT)
Dept: UROLOGY | Facility: CLINIC | Age: 67
End: 2022-01-03

## 2022-01-03 VITALS — WEIGHT: 155 LBS | BODY MASS INDEX: 20.99 KG/M2 | HEIGHT: 72 IN

## 2022-01-03 DIAGNOSIS — R10.9 FLANK PAIN: Primary | ICD-10-CM

## 2022-01-03 DIAGNOSIS — R10.9 FLANK PAIN: ICD-10-CM

## 2022-01-03 DIAGNOSIS — C61 PROSTATE CANCER: ICD-10-CM

## 2022-01-03 PROCEDURE — 74018 RADEX ABDOMEN 1 VIEW: CPT

## 2022-01-03 PROCEDURE — 74018 RADEX ABDOMEN 1 VIEW: CPT | Performed by: RADIOLOGY

## 2022-01-03 PROCEDURE — 51700 IRRIGATION OF BLADDER: CPT | Performed by: UROLOGY

## 2022-01-03 NOTE — PROGRESS NOTES
Chief Complaint:          Chief Complaint   Patient presents with   • Benign Prostatic Hypertrophy     follow up        HPI:   66 y.o. male returns today.  Has high-grade Romulo cancer I was to get a staging work-up but he did not get it done yet he is on Casodex I gave him a voiding trial I will see him back in 2 weeks hopefully he will get his staging work-up done      Past Medical History:        Past Medical History:   Diagnosis Date   • Elevated cholesterol    • Hypertension     but refuses to take medication as he had a drop in blood pressure once   • Prostate cancer (HCC) 12/26/2021         Current Meds:     Current Outpatient Medications   Medication Sig Dispense Refill   • bicalutamide (Casodex) 50 MG chemo tablet Take 1 tablet by mouth Daily for 30 days. 30 tablet 3   • cholecalciferol (VITAMIN D3) 1000 units tablet Take 1,000 Units by mouth Daily.     • ciprofloxacin (Cipro) 500 MG tablet Take 1 tablet by mouth 2 (Two) Times a Day. 6 tablet 0   • finasteride (PROSCAR) 5 MG tablet Take 1 tablet by mouth Daily. 30 tablet 5   • HYDROcodone-acetaminophen (NORCO)  MG per tablet Take 1 tablet by mouth Every 4 (Four) Hours As Needed for Moderate Pain. 12 tablet 0   • Lecithin-I-B6-Cider Vinegar (Lecithin-Kelp-B6-Cider Vinegar) 200-0.075-5-50 MG capsule Take 1 capsule by mouth Daily.     • lisinopril (PRINIVIL,ZESTRIL) 20 MG tablet Take 20 mg by mouth Daily.  2   • Omega-3 Fatty Acids (fish oil) 1000 MG capsule capsule Take 1,000 mg by mouth Daily With Breakfast.     • Specialty Vitamins Products (JOINT/BONE VITALITY PO) Take 1 tablet by mouth Daily.     • tamsulosin (Flomax) 0.4 MG capsule 24 hr capsule Take 1 capsule by mouth Every Night. 30 capsule 5     No current facility-administered medications for this visit.        Allergies:      No Known Allergies     Past Surgical History:     Past Surgical History:   Procedure Laterality Date   • ABDOMINAL SURGERY     • CHOLECYSTECTOMY     • CHOLECYSTECTOMY  WITH INTRAOPERATIVE CHOLANGIOGRAM N/A 8/3/2016    Procedure: CHOLECYSTECTOMY LAPAROSCOPIC INTRAOPERATIVE CHOLANGIOGRAM;  Surgeon: Garrett Barnes MD;  Location: [facility] OR;  Service:    • CYSTOSCOPY TRANSURETHRAL RESECTION OF PROSTATE N/A 12/15/2021    Procedure: Cystoscopy and transurethral resection of the prostate;  Surgeon: Manjit Serrano MD;  Location: [facility] OR;  Service: Urology;  Laterality: N/A;   • FRACTURE SURGERY  2015    ORIF RIGHT LEG    • HAND SURGERY Right     SECOND DIGIT AT FIRST AND SECOND JOINT SURGICAL REPAIR RELATED TO INJURY   • LEG SURGERY           Social History:     Social History     Socioeconomic History   • Marital status:    Tobacco Use   • Smoking status: Former Smoker     Packs/day: 1.00     Years: 15.00     Pack years: 15.00     Types: Cigarettes   • Smokeless tobacco: Never Used   • Tobacco comment: STOPPED SMOKING 10-12 YEARS AGO   Vaping Use   • Vaping Use: Never used   Substance and Sexual Activity   • Alcohol use: No   • Drug use: No   • Sexual activity: Defer       Family History:     Family History   Problem Relation Age of Onset   • Heart disease Paternal Grandmother    • Hypertension Sister    • Cancer Sister    • Depression Brother        Review of Systems:     Review of Systems   Constitutional: Negative.    HENT: Negative.    Eyes: Negative.    Respiratory: Negative.    Cardiovascular: Negative.    Gastrointestinal: Negative.    Endocrine: Negative.    Musculoskeletal: Negative.    Allergic/Immunologic: Negative.    Neurological: Negative.    Hematological: Negative.    Psychiatric/Behavioral: Negative.        Physical Exam:     Physical Exam  Vitals and nursing note reviewed.   Constitutional:       Appearance: He is well-developed.   HENT:      Head: Normocephalic and atraumatic.   Eyes:      Conjunctiva/sclera: Conjunctivae normal.      Pupils: Pupils are equal, round, and reactive to light.   Cardiovascular:      Rate and Rhythm: Normal rate and  regular rhythm.      Heart sounds: Normal heart sounds.   Pulmonary:      Effort: Pulmonary effort is normal.      Breath sounds: Normal breath sounds.   Abdominal:      General: Bowel sounds are normal.      Palpations: Abdomen is soft.   Musculoskeletal:         General: Normal range of motion.      Cervical back: Normal range of motion.   Skin:     General: Skin is warm and dry.   Neurological:      Mental Status: He is alert and oriented to person, place, and time.      Deep Tendon Reflexes: Reflexes are normal and symmetric.   Psychiatric:         Behavior: Behavior normal.         Thought Content: Thought content normal.         Judgment: Judgment normal.         I have reviewed the following portions of the patient's history: Allergies, current medications, past family history, past medical history, past social history, past surgical history, problem list, and ROS and confirm it is accurate.        Procedure:       Assessment/Plan:   Prostate cancer:  He returns today status post biopsy for extensive discussion of prostate cancer.  We discussed staging and grading of the disease.  I described with a Romulo system being from a 2 to10 scale with the most common low-grade pattern being a [place] 6.  I discussed the staging workup including a total body bone scan and CT scan especially with a PSA greater than 10.  We discussed the various options at length. I discussed a radical retropubic prostatectomy done in the traditional fashion and using the robotic technique.  I then discussed radiation treatment both seed therapy and external beam therapy using Gold fiduciary markers.  We talked about some of the other alternatives such as a cryosurgical ablation at high intensity focused ultrasound as being viable alternatives but not recommended at this time.  He focused on aggressive watchful waiting and explained that currently low literature it's been discovered that a lot of men can actually observe it especially  with numerous other comorbidities cannot have problems from the cancer by itself.  Talked about hormonal ablation and the side effects of creation of insulin resistance.  Overall, the patient was given appropriate literature, is going to think about it, and I'm going to revisit the topic with them after the next office visit.  He has prostate cancer in his TUR specimen I gave him a voiding trial today he has a CT scan total body bone scan pending he is on Casodex currently pending the outcome of his staging work-up ultimately, probably recommend radiation but I wanted to make sure he is voiding for at least 6 weeks first            This document has been electronically signed by LISA GREY MD January 3, 2022 13:40 EST

## 2022-01-18 ENCOUNTER — HOSPITAL ENCOUNTER (OUTPATIENT)
Dept: CT IMAGING | Facility: HOSPITAL | Age: 67
Discharge: HOME | End: 2022-01-18
Admitting: UROLOGY

## 2022-01-18 DIAGNOSIS — C61 PROSTATE CANCER: ICD-10-CM

## 2022-01-18 PROCEDURE — 25010000002 IOPAMIDOL 61 % SOLUTION: Performed by: UROLOGY

## 2022-01-18 PROCEDURE — 74178 CT ABD&PLV WO CNTR FLWD CNTR: CPT

## 2022-01-18 PROCEDURE — 74178 CT ABD&PLV WO CNTR FLWD CNTR: CPT | Performed by: RADIOLOGY

## 2022-01-18 RX ADMIN — IOPAMIDOL 90 ML: 612 INJECTION, SOLUTION INTRAVENOUS at 09:38

## 2022-01-21 ENCOUNTER — HOSPITAL ENCOUNTER (OUTPATIENT)
Dept: NUCLEAR MEDICINE | Facility: HOSPITAL | Age: 67
Discharge: HOME | End: 2022-01-21

## 2022-01-21 DIAGNOSIS — C61 PROSTATE CANCER: ICD-10-CM

## 2022-01-21 PROCEDURE — A9561 TC99M OXIDRONATE: HCPCS | Performed by: UROLOGY

## 2022-01-21 PROCEDURE — 78306 BONE IMAGING WHOLE BODY: CPT | Performed by: RADIOLOGY

## 2022-01-21 PROCEDURE — 78306 BONE IMAGING WHOLE BODY: CPT

## 2022-01-21 PROCEDURE — 0 TECHNETIUM OXIDRONATE KIT: Performed by: UROLOGY

## 2022-01-21 RX ADMIN — TECHNETIUM TC 99M OXIDRONATE 1 DOSE: 3.15 INJECTION, POWDER, LYOPHILIZED, FOR SOLUTION INTRAVENOUS at 09:00

## 2022-03-04 ENCOUNTER — OFFICE VISIT (OUTPATIENT)
Dept: UROLOGY | Facility: CLINIC | Age: 67
End: 2022-03-04

## 2022-03-04 VITALS — HEIGHT: 72 IN | WEIGHT: 154.98 LBS | BODY MASS INDEX: 20.99 KG/M2

## 2022-03-04 DIAGNOSIS — N13.8 BPH WITH URINARY OBSTRUCTION: ICD-10-CM

## 2022-03-04 DIAGNOSIS — N40.1 BPH WITH URINARY OBSTRUCTION: ICD-10-CM

## 2022-03-04 DIAGNOSIS — C61 PROSTATE CANCER: Primary | ICD-10-CM

## 2022-03-04 PROCEDURE — 51798 US URINE CAPACITY MEASURE: CPT | Performed by: UROLOGY

## 2022-03-04 PROCEDURE — 84153 ASSAY OF PSA TOTAL: CPT | Performed by: UROLOGY

## 2022-03-04 PROCEDURE — 99215 OFFICE O/P EST HI 40 MIN: CPT | Performed by: UROLOGY

## 2022-03-04 NOTE — PROGRESS NOTES
Prostate Cancer Office Visit      Patient Name: David Frias  : 1955   MRN: [id number]     Chief Complaint:  Prostate Cancer.   Chief Complaint   Patient presents with   • Prostate Cancer       Referring Provider: No ref. provider found    History of Present Illness: David Frias is a 66 y.o. male who presents today with recently diagnosed prostate cancer.   He was diagnosed in December for TURP.  On his TURP specimen he was found to have Lopez 9 prostate cancer.  He was supposed to start Casodex but has not done so.  He subsequently got CT and bone scan which were negative.       He initially presented with urinary complaints and subsequently underwent a TURP in December.  His TURP PSA was 3.5 (on finasteride).    This showed Grade Group 5 disease.     He reports his obstructive lower urinary tract symptoms have resolved.   He does have urgency and frequency but it has slowly been improving.  He is not interested in starting any medication at this time since it continues to improve.      Family history of prostate cancer: none; denies BRCA genetic pre-disposition or prior Morris syndrome diagnoses among family members.    He reports he is not getting any erections at this time.       [facility] Prostate Nomogram (Pre-Radical Prostatectomy):  Primary Treatment Outcomes  Cancer specific survival  10 yr: 99%  15 yr: 99  Progression Free Survival  5 yr: 46%  10 yr: 32%    Extent of Disease Probability Organ Confined Disease:  Extracapsular extension: 76%  Lymph node involvement: 24%  Seminal vesicle invasion: 24%      Subjective      Review of System: Review of Systems   HENT: Negative for ear pain and postnasal drip.    Respiratory: Negative for choking and shortness of breath.    Cardiovascular: Negative for chest pain and palpitations.   Gastrointestinal: Negative for abdominal pain.   Genitourinary: Negative for frequency and penile discharge.   Musculoskeletal: Negative for back pain.   Neurological:  Negative for speech difficulty.   Psychiatric/Behavioral: Negative for agitation.      I have reviewed the ROS documented by my clinical staff, I updated appropriately and I agree. Kavon Delgado MD    Past Medical History:   Past Medical History:   Diagnosis Date   • Elevated cholesterol    • Hypertension     but refuses to take medication as he had a drop in blood pressure once   • Prostate cancer (HCC) 12/26/2021       Past Surgical History:   Past Surgical History:   Procedure Laterality Date   • ABDOMINAL SURGERY     • CHOLECYSTECTOMY     • CHOLECYSTECTOMY WITH INTRAOPERATIVE CHOLANGIOGRAM N/A 8/3/2016    Procedure: CHOLECYSTECTOMY LAPAROSCOPIC INTRAOPERATIVE CHOLANGIOGRAM;  Surgeon: Garrett Barnes MD;  Location: [facility];  Service:    • CYSTOSCOPY TRANSURETHRAL RESECTION OF PROSTATE N/A 12/15/2021    Procedure: Cystoscopy and transurethral resection of the prostate;  Surgeon: Manjit Serrano MD;  Location: [facility];  Service: Urology;  Laterality: N/A;   • FRACTURE SURGERY  2015    ORIF RIGHT LEG    • HAND SURGERY Right     SECOND DIGIT AT FIRST AND SECOND JOINT SURGICAL REPAIR RELATED TO INJURY   • LEG SURGERY         Family History:   Family History   Problem Relation Age of Onset   • Heart disease Paternal Grandmother    • Hypertension Sister    • Cancer Sister    • Depression Brother        Social History:   Social History     Socioeconomic History   • Marital status:    Tobacco Use   • Smoking status: Former Smoker     Packs/day: 1.00     Years: 15.00     Pack years: 15.00     Types: Cigarettes   • Smokeless tobacco: Never Used   • Tobacco comment: STOPPED SMOKING 10-12 YEARS AGO   Vaping Use   • Vaping Use: Never used   Substance and Sexual Activity   • Alcohol use: No   • Drug use: No   • Sexual activity: Defer       Medications:     Current Outpatient Medications:   •  cholecalciferol (VITAMIN D3) 1000 units tablet, Take 1,000 Units by mouth Daily., Disp: , Rfl:   •  ciprofloxacin  (Cipro) 500 MG tablet, Take 1 tablet by mouth 2 (Two) Times a Day., Disp: 6 tablet, Rfl: 0  •  finasteride (PROSCAR) 5 MG tablet, Take 1 tablet by mouth Daily., Disp: 30 tablet, Rfl: 5  •  HYDROcodone-acetaminophen (NORCO)  MG per tablet, Take 1 tablet by mouth Every 4 (Four) Hours As Needed for Moderate Pain., Disp: 12 tablet, Rfl: 0  •  Lecithin-I-B6-Cider Vinegar (Lecithin-Kelp-B6-Cider Vinegar) 200-0.075-5-50 MG capsule, Take 1 capsule by mouth Daily., Disp: , Rfl:   •  lisinopril (PRINIVIL,ZESTRIL) 20 MG tablet, Take 20 mg by mouth Daily., Disp: , Rfl: 2  •  Omega-3 Fatty Acids (fish oil) 1000 MG capsule capsule, Take 1,000 mg by mouth Daily With Breakfast., Disp: , Rfl:   •  Specialty Vitamins Products (JOINT/BONE VITALITY PO), Take 1 tablet by mouth Daily., Disp: , Rfl:   •  tamsulosin (Flomax) 0.4 MG capsule 24 hr capsule, Take 1 capsule by mouth Every Night., Disp: 30 capsule, Rfl: 5    Allergies:   No Known Allergies    IPSS Questionnaire (AUA-7):  Over the past month…    1)  Incomplete Emptying  How often have you had a sensation of not emptying your bladder?  0 - Not at all   2)  Frequency  How often have you had to urinate less than every two hours? 1 - Less than 1 time in 5   3)  Intermittency  How often have you found you stopped and started again several times when you urinated?  0 - Not at all   4) Urgency  How often have you found it difficult to postpone urination?  0 - Not at all   5) Weak Stream  How often have you had a weak urinary stream?  2 - Less than half the time   6) Straining  How often have you had to push or strain to begin urination?  0 - Not at all   7) Nocturia  How many times did you typically get up at night to urinate?  3 - 3 times   Total Score:  6   The International Prostate Symptom Score (IPSS) is used to screen, diagnose, track symptoms of benign prostatic hyperplasia (BPH).    0-7 pts (Mild Symptoms)  / 8-19 pts (Moderate) / 20-35 (Severe)    Quality of life due to  "urinary symptoms:  If you were to spend the rest of your life with your urinary condition the way it is now, how would you feel about that? 2-Mostly SatisfiedP   Urine Leakage (Incontinence) 0-No Leakage       Post void residual bladder scan:   8 mL     Objective     Physical Exam:   Vital Signs:   Vitals:    03/04/22 0959   Weight: 70.3 kg (154 lb 15.7 oz)   Height: 182.9 cm (72.01\")     Body mass index is 21.01 kg/m².     Physical Exam  Vitals and nursing note reviewed.   Constitutional:       General: He is awake. He is not in acute distress.     Appearance: Normal appearance. He is well-developed.   HENT:      Head: Normocephalic and atraumatic.      Right Ear: External ear normal.      Left Ear: External ear normal.      Nose: Nose normal.   Eyes:      Conjunctiva/sclera: Conjunctivae normal.   Pulmonary:      Effort: Pulmonary effort is normal.   Abdominal:      General: There is no distension.      Palpations: Abdomen is soft. There is no mass.      Tenderness: There is no abdominal tenderness. There is no right CVA tenderness, left CVA tenderness, guarding or rebound.      Hernia: No hernia is present. There is no hernia in the left inguinal area or right inguinal area.   Genitourinary:     Pubic Area: No rash.       Rectum: No mass or tenderness. Normal anal tone.   Musculoskeletal:      Cervical back: Normal range of motion.   Lymphadenopathy:      Lower Body: No right inguinal adenopathy. No left inguinal adenopathy.   Skin:     General: Skin is warm.   Neurological:      General: No focal deficit present.      Mental Status: He is alert and oriented to person, place, and time.   Psychiatric:         Behavior: Behavior normal. Behavior is cooperative.         Labs:   Lab Results   Component Value Date    PSA 3.540 04/03/2018       Lab Results   Component Value Date    WBC 18.53 (H) 12/18/2021    HGB 15.3 12/18/2021    HCT 44.7 12/18/2021    .4 (H) 12/18/2021     12/18/2021       Lab Results "   Component Value Date    GLUCOSE 138 (H) 12/18/2021    BUN 16 12/18/2021    CREATININE 1.07 12/18/2021    EGFRIFNONA 69 12/18/2021    BCR 15.0 12/18/2021    K 3.5 12/18/2021    CO2 26.1 12/18/2021    CALCIUM 9.5 12/18/2021    ALBUMIN 4.07 12/18/2021    LABIL2 1.4 (L) 05/24/2015    AST 20 12/18/2021    ALT 23 12/18/2021       Images:   CT Abdomen Pelvis With & Without Contrast    Result Date: 1/18/2022   1. Small parenchymal nodules in both lungs  2.Diffuse urinary bladder wall thickening  3. Prostate enlargement 4. Other findings as above     This report was finalized on 1/18/2022 10:13 AM by Dr. Keith Madden MD.      CT Abdomen Pelvis Without Contrast    Result Date: 11/29/2021   1. There is stranding in the right lower quadrant. The appendix proper however is not visualized. Recommend clinical correlation.  2.Prostate gland is enlarged  3. Other findings as above     This report was finalized on 11/29/2021 1:55 PM by Dr. Keith Madden MD.      NM Bone Scan Whole Body    Result Date: 1/21/2022  No scintigraphic evidence of osteoblastic metastatic disease.  This report was finalized on 1/21/2022 1:06 PM by Dr. Efe Aguero MD.      CT Abdomen Pelvis Stone Protocol    Result Date: 12/18/2021  1.  Severe bladder outlet obstruction with marked distention of the urinary bladder and moderately severe bilateral hydronephrosis. This should be associated with marked prostate enlargement as detailed above. Bladder volume 1130 cc. Prostate volume 187 cc. 2.  Cholecystectomy. Signer Name: Ritchie Carl MD  Signed: 12/18/2021 5:33 PM  Workstation Name: [facility]    XR Abdomen KUB    Result Date: 1/3/2022  Faint calcifications are noted overlying the collecting systems of both kidneys consistent with nephrolithiasis.  This report was finalized on 1/3/2022 2:58 PM by Dr. Lenny Gabriel II, MD.        Measures:   Tobacco:   David Frias  reports that he has quit smoking. His smoking  "use included cigarettes. He has a 15.00 pack-year smoking history. He has never used smokeless tobacco..    Urine Incontinence: Patient reports that he is not currently experiencing any symptoms of urinary incontinence.        Assessment / Plan      Assessment:   Mr. Frias is a 66 y.o. male who presented today with recently diagnosed prostate cancer.  For hearing the risks and benefits of each treatment option he has elected to undergo radiation.  He reports that he is not willing to risk incontinence after surgery.  He states that he is not interested in any surgical intervention at this time whatsoever.  I will refer him to Dr. Mckenzie for further discussion and treatment of his prostate cancer.      I had the pleasure of meeting with David Frias in clinic today.  I reviewed his diagnosis of prostate cancer and how the Romulo score is used in the risk stratification system together with PSA and clinical examination.  I discussed the Romulo score as well as \"Grade Group Scoring\" in detail with respect to their role in tumor biology and behavior.      I then discussed the treatment options for a man with high risk disease as outlined by the NCCN in whom at least 10 years of life expectancy is anticipated:    1.  Surgery with pelvic lymph node dissection +/- adjuvant therapies  2.  External beam radiotherapy or brachytherapy  3.  Active surveillance    Radiation  I had a brief discussion with the patient's about some of the pros and cons to radiation, pros would include lower upfront quality of life changes and reduced initial side effects associated with urinary incontinence and erectile dysfunction, however both of these are possible long-term.  We also discussed possibilities of bladder and rectal irritation with radiation.  I also briefly mentioned the difficulty associated with post radiation prostatectomy if the patient were to develop a recurrence after definitive treatment with radiation.  This is in contrast to " upfront surgery with the ability to perform salvage or adjuvant radiation if the patient experiences biochemical recurrence after definitive therapy with surgery.  Patient expressed understanding.    As I do with all patients I  with newly diagnosed prostate cancer, I encouraged him to seek out further discussion with Radiation-Oncology for a more detailed discussion, and offered him a referral if he would like.     Surveillance  I discussed the use of semi-annual PSA testing, periodic prostate MRI and targeted biopsies.  I explained that the ideal candidate for active surveillance is a man with low risk prostate cancer.  I discussed that some centers have placed patients with favorable intermediate risk disease on active surveillance protocols and this is currently being study, however these patients are at an increased with of metastatic progression during follow-up and that close monitoring would be required.  I explained that I would recommend molecular tumor testing in order to better understand their risk if they do choose this approach.  I additionally explained that I prefer yearly biopsies in men with intermediate risk disease.    Surgery  I reviewed the role of surgery and the relevant surgical anatomy and the role of the robotic platform.  I explained that surgery for prostate cancer exists on a continuum of quality of life outcomes and cancer control.  We reviewed that a maximal cancer surgery is also associated with maximal impacts on quality of life (erectile dysfunction and incontinence).  I explained that the approach utilized for the surgery is driven by his goals of cancer care and his particular pathology and staging.      I explained that incontinence after robotic or open prostatectomy is typically an inevitability, but usually improves within weeks after surgery, the majority of men are dry just a few months after surgery, but some may struggle with incontinence out to a year, and some  "men may still be dealing with incontinence after 12 months.  Greater than 90% of men will achieve continence at 1 year.  Failure to achieve continence after 12 months is considered abnormal, and patients are typically offered surgical treatment options to correct this if needed.    I reviewed that the rate of potency is dependent on baseline functional status and time.  Specifically if a bilateral nerve sparing procedure were performed in the setting of perfect erections pre-operatively, that we would expect roughly 85% of men to regain potency within 2 years of surgery; most of them beginning their recovery around 6-9 months from surgery.  Of these 85% of men, roughly 50% will require medications to support their erections long-term and that all men will initially require some degree of medication support.  Of the 15% of men who do not regain function, this will require either a vacuum erection device or injection therapy.  The trade-off is that with more preserved tissue there is a greater probability of positive surgical margins.  The presence of a margin would therefore be associated with an increased risk of recurrent disease and need for adjuvant and salvage therapies.       On the alternative end of the surgical spectrum we could proceed with with \"wide\" dissection bilaterally, which would maximize the tissues removed.  This would result in longer time to continence and potency only achievable with injection or vacuum erection devices given removal of the nerves which are necessary for natural erections.  The rate of continence is the same 95% at 1 year, but it takes more time to achieve that result, with most men achieving continence around 6 months from surgery.  While this approach does not guarantee negative margins and a lack of recurrence, the rate of margins is lessened with the greater tissue removed.    We discussed the role of pelvic lymphadenectomy or removal of the pelvic lymph node tissues to " assist with staging the patient and ruling out local prostate cancer spread, we also discussed that lymph node removal may offer cancer specific survival benefit in some men as well, by removing a potential source of microscopic cancer cells or potential sites of spread.  I described to the patient that I always remove lymph nodes during prostatectomy, and then I believe it gives us the best and most robust information in terms of his overall cancer staging, which may inform need for further treatment post prostatectomy if advanced disease or locally metastatic disease is found.    I also reviewed the specific procedural risks, which include, but are not limited to infection, bleeding, need for blood transfusion, and injury to surrounding structures including the rectum, small bowel, bladder, ureters, blood vessels, nerves specifically the obturator nerve.  I reviewed the general procedural risks of wound healing complications, wound infections, conversion to open procedure as well as termination of procedure, or need for additional procedures. We have discussed the risk of long term neuropraxia, air emboli, MI, DVT, PE, stroke, and death.      Survivorship   I then discussed survivorship care which consists of monitoring for recurrence and management of treatment related side effects.    I reviewed how pathology dictates follow-up and risk of recurrence.  I explained that men with treated prostate cancer (surgery or radiation) are at risk of recurrence during follow-up and that historically up around 30% of men will require adjuvant treatments; often based on the post-surgical pathology.  I additionally reviewed how monitoring is performed to maximize the benefit of adjuvant therapies should they be required.      I provided the patient a copy of my prostate cancer packet and encouraged him to review it in detail as it reinforces and expands on the risks and benefits of each approach to managing  prostatectomy.        Diagnoses and all orders for this visit:    1. Prostate cancer (HCC) (Primary)  -     Bladder Scan  -     Bladder Scan  -     [facility]  -     PSA DIAGNOSTIC; Future  -     PSA DIAGNOSTIC    2. BPH with urinary obstruction  -     Bladder Scan  -     Bladder Scan           Follow Up:   Return in about 3 months (around 6/4/2022).    I spent approximately 40 minutes providing clinical care for this patient; including review of patient's chart and provider documentation, face to face time spent with patient in examination room (obtaining history, performing physical exam, discussing diagnosis and management options), placing orders, and completing patient documentation.     Kavon Delgado MD  [facility] [place] [facility]

## 2022-03-05 LAB — PSA SERPL-MCNC: 11.5 NG/ML (ref 0–4)

## 2022-03-14 ENCOUNTER — OFFICE VISIT (OUTPATIENT)
Dept: RADIATION ONCOLOGY | Facility: HOSPITAL | Age: 67
End: 2022-03-14

## 2022-03-14 ENCOUNTER — CONSULT (OUTPATIENT)
Dept: RADIATION ONCOLOGY | Facility: HOSPITAL | Age: 67
End: 2022-03-14

## 2022-03-14 ENCOUNTER — DOCUMENTATION (OUTPATIENT)
Dept: ONCOLOGY | Facility: HOSPITAL | Age: 67
End: 2022-03-14

## 2022-03-14 VITALS
WEIGHT: 154 LBS | SYSTOLIC BLOOD PRESSURE: 182 MMHG | BODY MASS INDEX: 20.88 KG/M2 | DIASTOLIC BLOOD PRESSURE: 83 MMHG | OXYGEN SATURATION: 94 % | TEMPERATURE: 97.7 F | HEART RATE: 79 BPM | RESPIRATION RATE: 18 BRPM

## 2022-03-14 DIAGNOSIS — C61 PROSTATE CANCER: ICD-10-CM

## 2022-03-14 PROCEDURE — G0463 HOSPITAL OUTPT CLINIC VISIT: HCPCS | Performed by: RADIOLOGY

## 2022-03-14 PROCEDURE — 99204 OFFICE O/P NEW MOD 45 MIN: CPT | Performed by: RADIOLOGY

## 2022-03-14 PROCEDURE — 77263 THER RADIOLOGY TX PLNG CPLX: CPT | Performed by: RADIOLOGY

## 2022-03-14 RX ORDER — BICALUTAMIDE 50 MG/1
TABLET, FILM COATED ORAL
COMMUNITY
Start: 2022-02-14 | End: 2022-06-03

## 2022-03-14 NOTE — PROGRESS NOTES
New Patient Office Consult      Patient Name: David Frias  : 1955   MRN: [id number]     Requesting Physician: Kavon Delgado MD    Chief Complaint:    Chief Complaint   Patient presents with   • Prostate Cancer      Pathology: * Cannot find OR log *   Staging: No matching staging information was found for the patient.     History of Present Illness: David Frias is a pleasant 66 y.o. male who is here today for consultation regarding recently diagnosed prostate cancer.   He was diagnosed in December for TURP.  On his TURP specimen he was found to have Crosby 9 prostate cancer.  He was supposed to start Casodex but has not done so.  He subsequently got CT and bone scan which were negative.        He initially presented with urinary complaints and subsequently underwent a TURP in December.  His TURP PSA was 3.5 (on finasteride).    This showed Grade Group 5 disease.      He reports his obstructive lower urinary tract symptoms have resolved.   He does have urgency and frequency but it has slowly been improving.  He is not interested in starting any medication at this time since it continues to improve.    Subjective      Review of Systems:   Review of Systems   HENT: Positive for hearing loss.    Eyes: Negative.    Cardiovascular: Negative.    Genitourinary: Positive for frequency.   Neurological: Negative.      Review of Systems   HENT:   Positive for hearing loss.    Eyes: Negative.    Cardiovascular: Negative.    Genitourinary: Positive for frequency.    Neurological: Negative.        I have reviewed and confirmed the accuracy of the ROS as documented by the MA/LPN/RN Baldo Mckenzie MD     Past Oncology History:   Oncology/Hematology History    No history exists.        Past Radiation History:  No previous exposures to ionizing radiation therapy and there are not relative contraindications including connective tissue disorder.     Past Medical History:   Past Medical History:   Diagnosis Date   • Elevated  cholesterol    • Hypertension     but refuses to take medication as he had a drop in blood pressure once   • Prostate cancer (HCC) 12/26/2021       Past Surgical History:   Past Surgical History:   Procedure Laterality Date   • ABDOMINAL SURGERY     • CHOLECYSTECTOMY     • CHOLECYSTECTOMY WITH INTRAOPERATIVE CHOLANGIOGRAM N/A 8/3/2016    Procedure: CHOLECYSTECTOMY LAPAROSCOPIC INTRAOPERATIVE CHOLANGIOGRAM;  Surgeon: Garrett Barnes MD;  Location: [facility];  Service:    • CYSTOSCOPY TRANSURETHRAL RESECTION OF PROSTATE N/A 12/15/2021    Procedure: Cystoscopy and transurethral resection of the prostate;  Surgeon: Manjit Serrano MD;  Location: [facility];  Service: Urology;  Laterality: N/A;   • FRACTURE SURGERY  2015    ORIF RIGHT LEG    • HAND SURGERY Right     SECOND DIGIT AT FIRST AND SECOND JOINT SURGICAL REPAIR RELATED TO INJURY   • LEG SURGERY         Family History:   Family History   Problem Relation Age of Onset   • Heart disease Paternal Grandmother    • Hypertension Sister    • Cancer Sister    • Depression Brother        Social History:   Social History     Socioeconomic History   • Marital status:    Tobacco Use   • Smoking status: Former Smoker     Packs/day: 1.00     Years: 15.00     Pack years: 15.00     Types: Cigarettes   • Smokeless tobacco: Never Used   • Tobacco comment: STOPPED SMOKING 10-12 YEARS AGO   Vaping Use   • Vaping Use: Never used   Substance and Sexual Activity   • Alcohol use: No   • Drug use: No   • Sexual activity: Defer       Medications:     Current Outpatient Medications:   •  bicalutamide (CASODEX) 50 MG chemo tablet, , Disp: , Rfl:   •  cholecalciferol (VITAMIN D3) 1000 units tablet, Take 1,000 Units by mouth Daily., Disp: , Rfl:   •  ciprofloxacin (Cipro) 500 MG tablet, Take 1 tablet by mouth 2 (Two) Times a Day., Disp: 6 tablet, Rfl: 0  •  HYDROcodone-acetaminophen (NORCO)  MG per tablet, Take 1 tablet by mouth Every 4 (Four) Hours As Needed for Moderate  Pain., Disp: 12 tablet, Rfl: 0  •  Lecithin-I-B6-Cider Vinegar (Lecithin-Kelp-B6-Cider Vinegar) 200-0.075-5-50 MG capsule, Take 1 capsule by mouth Daily., Disp: , Rfl:   •  lisinopril (PRINIVIL,ZESTRIL) 20 MG tablet, Take 20 mg by mouth Daily., Disp: , Rfl: 2  •  Omega-3 Fatty Acids (fish oil) 1000 MG capsule capsule, Take 1,000 mg by mouth Daily With Breakfast., Disp: , Rfl:   •  Specialty Vitamins Products (JOINT/BONE VITALITY PO), Take 1 tablet by mouth Daily., Disp: , Rfl:   •  tamsulosin (Flomax) 0.4 MG capsule 24 hr capsule, Take 1 capsule by mouth Every Night., Disp: 30 capsule, Rfl: 5  •  finasteride (PROSCAR) 5 MG tablet, Take 1 tablet by mouth Daily., Disp: 30 tablet, Rfl: 5    Allergies:   No Known Allergies    KPS: 90 %     Results Review:              Objective     Physical Exam:  Physical Exam  HENT:      Head: Normocephalic.      Nose: Nose normal.   Cardiovascular:      Rate and Rhythm: Normal rate.      Pulses: Normal pulses.   Musculoskeletal:         General: Normal range of motion.      Cervical back: Normal range of motion.   Skin:     General: Skin is warm.   Neurological:      Mental Status: He is alert.         Vital Signs:   Vitals:    03/14/22 0957   BP: (!) 182/83   Pulse: 79   Resp: 18   Temp: 97.7 °F (36.5 °C)   TempSrc: Temporal   SpO2: 94%   Weight: 69.9 kg (154 lb)   PainSc: 0-No pain     Body mass index is 20.88 kg/m².       Assessment / Plan      Assessment/Plan:      66 year old M w high risk prostate cancer, PSA 11.5, Bx showed G9 (4+5) ; On his TURP specimen he was found to have Romulo 9 prostate cancer.  He was supposed to start Casodex but has not done so.  He subsequently got CT and bone scan which were negative.        He initially presented with urinary complaints and subsequently underwent a TURP in December.  His TURP PSA was 3.5 (on finasteride).  This showed Grade Group 5 disease.    He reports that he is not willing to risk incontinence after surgery.      In summary,  this is a generally in excellent health, per the NCCN risk classification he has high risk disease. He would need lupron therapy for 2-3 years for his high risk disease.     Management options include active surveillance, surgery, and radiation therapy, which were all discussed in detail. In the case of radiation therapy, he would be a candidate for external beam therapy with androgen deprivation therapy together with radiation. I reviewed risks including urinary and/or bowel urgency and frequency, hematuria, hematochezia, diarrhea, fatigue, erectile dysfunction, urethral stricture, rectourinary fistula, and secondary pelvic malignancy. Ultimately, the patient, after a careful discussion of his preferences for relevant outcomes and concern about adverse effects, the patient has decided on external-beam radiation.       We will offer a course of external beam radiation therapy with IMRT and IGRT for this patient.  With a 4500 cGy in 180 cGy per fraction to his pelvic lymph nodes, prostate and seminal vesicles, and total dose of 8100 cGy/ 180 cGy per fraction in 9 weeks to his prostate and seminal vesicles.  I have I have explained the risk involving radiation therapy to the patient.    Will need HRT, will arrange to have Casodex and Lupron with .    Follow Up:   No follow-ups on file.    Baldo Mckenzie MD  [facility]

## 2022-03-22 ENCOUNTER — TELEPHONE (OUTPATIENT)
Dept: RADIATION ONCOLOGY | Facility: HOSPITAL | Age: 67
End: 2022-03-22

## 2022-03-22 NOTE — TELEPHONE ENCOUNTER
"Called and spoke with David to remind him about his scheduled CT Sim tomorrow 3/23/22 at 10:30 AM. Pt stated that he was not coming to appt, he cancelled that the day after it was scheduled because he felt that he was treated poorly and refuses to come back. Pt ended the conversation by stating \"The Dr better be glad that he didn't need a Dr because no one treats him the way he was treated\".   "

## 2022-03-23 ENCOUNTER — APPOINTMENT (OUTPATIENT)
Dept: RADIATION ONCOLOGY | Facility: HOSPITAL | Age: 67
End: 2022-03-23

## 2022-03-28 ENCOUNTER — TELEPHONE (OUTPATIENT)
Dept: UROLOGY | Facility: CLINIC | Age: 67
End: 2022-03-28

## 2022-03-28 NOTE — PROGRESS NOTES
Patient is 65 Y/O white male diagnosed with Prostate Cancer.    Patient being seen this date by Dr. Mckenzie.    Distress Screening Follow-up    Diagnosis: Prostate Cancer    Location of Distress Screening: Radiation Oncology    Distress Level: 0-No distress (3/14/2022 10:01 AM)    Physical Concerns:    Appearance: No  Bathing/Dressing: No  Breathing: No  Changes in urination: Yes  Constipation: No  Diarrhea: No  Eating: No  Fatigue: No  Feeling swollen: No  Fevers: No  Getting around: No  Indigestion: No  Memory/Concentration: No  Mouth sores: No  Nausea: No  Nose dry/congested: No  Pain: No  Sexual: No  Skin dry/itchy: No  Sleep: No  Substance abuse: No  Tingling hands/feet: No    Practical Problems:    : No  Food: No  Housing: No  Insurance/Financial: No  Transportation: No  Work/School: No  Treatment decisions: No    Emotional Concerns:    Depression: No  Nervousness: No  Sadness: No  Worry: No  Loss of Interest/Activities: No  Emotional Concerns Comment: No    Family Concerns:    Dealing with children: No  Dealing with partner: No  Ability to have children: No  Family health issues: No  Family Concerns Comment: No    Spiritual Concerns:    Spiritual/Episcopal Concerns: No     Interventions:        Comments:    SS to follow and assist as needed for resource assistance and support.

## 2022-06-03 ENCOUNTER — OFFICE VISIT (OUTPATIENT)
Dept: UROLOGY | Facility: CLINIC | Age: 67
End: 2022-06-03

## 2022-06-03 VITALS — BODY MASS INDEX: 20.87 KG/M2 | HEIGHT: 72 IN | WEIGHT: 154.1 LBS

## 2022-06-03 DIAGNOSIS — C61 PROSTATE CANCER: Primary | ICD-10-CM

## 2022-06-03 PROCEDURE — G2212 PROLONG OUTPT/OFFICE VIS: HCPCS | Performed by: UROLOGY

## 2022-06-03 PROCEDURE — 99215 OFFICE O/P EST HI 40 MIN: CPT | Performed by: UROLOGY

## 2022-06-03 RX ORDER — BICALUTAMIDE 50 MG/1
50 TABLET, FILM COATED ORAL DAILY
Qty: 90 TABLET | Refills: 0 | Status: SHIPPED | OUTPATIENT
Start: 2022-06-03

## 2022-06-03 NOTE — PROGRESS NOTES
Follow Up Office Visit      Patient Name: David Frias  : 1955   MRN: [id number]     Chief Complaint:    Chief Complaint   Patient presents with   • Prostate Cancer       Referring Provider: No ref. provider found    History of Present Illness: David Frias is a 66 y.o. male who presents today for follow up of prostate cancer or TURP.  He was found to have high risk prostate cancer and was instructed to start ADT and radiation therapy.  However, he states he does not want radiation.  He has not been on his casodex or Lupron.    He reports he feels great.  He does not understand why he needs treated because he feels great.    No dysuria or gross hematuria.  Reports he is urinating well.      Subjective      Review of System: Review of Systems   Constitutional: Negative for chills, fatigue, fever and unexpected weight change.   HENT: Negative for congestion, rhinorrhea and sore throat.    Eyes: Negative for visual disturbance.   Respiratory: Negative for apnea, cough, chest tightness and shortness of breath.    Cardiovascular: Negative for chest pain.   Gastrointestinal: Negative for abdominal pain, constipation, diarrhea, nausea and vomiting.   Endocrine: Negative for polydipsia and polyuria.   Genitourinary: Negative for difficulty urinating, dysuria, enuresis, flank pain, frequency, genital sores, hematuria and urgency.   Musculoskeletal: Negative for gait problem.   Skin: Negative for rash and wound.   Allergic/Immunologic: Negative for immunocompromised state.   Neurological: Negative for dizziness, tremors, syncope, weakness and light-headedness.   Hematological: Does not bruise/bleed easily.      I have reviewed the ROS documented by my clinical staff, I have updated appropriately and I agree. Kavon Delgado MD    I have reviewed and the following portions of the patient's history were updated as appropriate: past family history, past medical history, past social history, past surgical history and  problem list.    Past Medical History:   Past Medical History:   Diagnosis Date   • Elevated cholesterol    • Hypertension     but refuses to take medication as he had a drop in blood pressure once   • Prostate cancer (HCC) 12/26/2021       Past Surgical History:  Past Surgical History:   Procedure Laterality Date   • ABDOMINAL SURGERY     • CHOLECYSTECTOMY     • CHOLECYSTECTOMY WITH INTRAOPERATIVE CHOLANGIOGRAM N/A 8/3/2016    Procedure: CHOLECYSTECTOMY LAPAROSCOPIC INTRAOPERATIVE CHOLANGIOGRAM;  Surgeon: Garrett Barnes MD;  Location: [facility] OR;  Service:    • CYSTOSCOPY TRANSURETHRAL RESECTION OF PROSTATE N/A 12/15/2021    Procedure: Cystoscopy and transurethral resection of the prostate;  Surgeon: Manjit Serrano MD;  Location: [facility] OR;  Service: Urology;  Laterality: N/A;   • FRACTURE SURGERY  2015    ORIF RIGHT LEG    • HAND SURGERY Right     SECOND DIGIT AT FIRST AND SECOND JOINT SURGICAL REPAIR RELATED TO INJURY   • LEG SURGERY         Family History:   family history includes Cancer in his sister; Depression in his brother; Heart disease in his paternal grandmother; Hypertension in his sister.   Otherwise pertinent FHx was reviewed and not pertinent to current issue.    Social History:    reports that he has quit smoking. His smoking use included cigarettes. He has a 15.00 pack-year smoking history. He has never used smokeless tobacco. He reports that he does not drink alcohol and does not use drugs.    Medications:     Current Outpatient Medications:   •  bicalutamide (CASODEX) 50 MG chemo tablet, Take 1 tablet by mouth Daily., Disp: 90 tablet, Rfl: 0  •  cholecalciferol (VITAMIN D3) 1000 units tablet, Take 1,000 Units by mouth Daily., Disp: , Rfl:   •  ciprofloxacin (Cipro) 500 MG tablet, Take 1 tablet by mouth 2 (Two) Times a Day., Disp: 6 tablet, Rfl: 0  •  finasteride (PROSCAR) 5 MG tablet, Take 1 tablet by mouth Daily., Disp: 30 tablet, Rfl: 5  •  HYDROcodone-acetaminophen (NORCO)  MG  "per tablet, Take 1 tablet by mouth Every 4 (Four) Hours As Needed for Moderate Pain., Disp: 12 tablet, Rfl: 0  •  Lecithin-I-B6-Cider Vinegar (Lecithin-Kelp-B6-Cider Vinegar) 200-0.075-5-50 MG capsule, Take 1 capsule by mouth Daily., Disp: , Rfl:   •  lisinopril (PRINIVIL,ZESTRIL) 20 MG tablet, Take 20 mg by mouth Daily., Disp: , Rfl: 2  •  Omega-3 Fatty Acids (fish oil) 1000 MG capsule capsule, Take 1,000 mg by mouth Daily With Breakfast., Disp: , Rfl:   •  Specialty Vitamins Products (JOINT/BONE VITALITY PO), Take 1 tablet by mouth Daily., Disp: , Rfl:   •  tamsulosin (Flomax) 0.4 MG capsule 24 hr capsule, Take 1 capsule by mouth Every Night., Disp: 30 capsule, Rfl: 5    Allergies:   No Known Allergies      Post void residual bladder scan:        Objective     Physical Exam:   Vital Signs:   Vitals:    06/03/22 1002   Weight: 69.9 kg (154 lb 1.6 oz)   Height: 182.9 cm (72.01\")     Body mass index is 20.9 kg/m².     Physical Exam  Vitals and nursing note reviewed.   Constitutional:       General: He is awake. He is not in acute distress.     Appearance: Normal appearance. He is well-developed.   HENT:      Head: Normocephalic and atraumatic.      Right Ear: External ear normal.      Left Ear: External ear normal.      Nose: Nose normal.   Eyes:      Conjunctiva/sclera: Conjunctivae normal.   Pulmonary:      Effort: Pulmonary effort is normal.   Abdominal:      General: There is no distension.      Palpations: Abdomen is soft. There is no mass.      Tenderness: There is no abdominal tenderness. There is no right CVA tenderness, left CVA tenderness, guarding or rebound.      Hernia: No hernia is present. There is no hernia in the left inguinal area or right inguinal area.   Genitourinary:     Pubic Area: No rash.       Rectum: No mass or tenderness. Normal anal tone.   Musculoskeletal:      Cervical back: Normal range of motion.   Lymphadenopathy:      Lower Body: No right inguinal adenopathy. No left inguinal " adenopathy.   Skin:     General: Skin is warm.   Neurological:      General: No focal deficit present.      Mental Status: He is alert and oriented to person, place, and time.   Psychiatric:         Behavior: Behavior normal. Behavior is cooperative.         Labs:   Brief Urine Lab Results  (Last result in the past 365 days)      Color   Clarity   Blood   Leuk Est   Nitrite   Protein   CREAT   Urine HCG        12/18/21 1640 Orange  Comment: Dipstick results may be inaccurate due to color interference.    Turbid   Large (3+)   Moderate (2+)   Negative   >=300 mg/dL (3+)                 Urine Culture    Urine Culture 12/18/21   Urine Culture <25,000 CFU/mL Normal Urogenital Sally              Lab Results   Component Value Date    GLUCOSE 138 (H) 12/18/2021    CALCIUM 9.5 12/18/2021     (L) 12/18/2021    K 3.5 12/18/2021    CO2 26.1 12/18/2021    CL 97 (L) 12/18/2021    BUN 16 12/18/2021    CREATININE 1.07 12/18/2021    EGFRIFNONA 69 12/18/2021    BCR 15.0 12/18/2021    ANIONGAP 10.9 12/18/2021       Lab Results   Component Value Date    WBC 18.53 (H) 12/18/2021    HGB 15.3 12/18/2021    HCT 44.7 12/18/2021    .4 (H) 12/18/2021     12/18/2021       Lab Results   Component Value Date    PSA 11.500 (H) 03/04/2022    PSA 3.540 04/03/2018       Images:   No Images in the past 120 days found..    Pathology:      Measures:   Tobacco:   David Frias  reports that he has quit smoking. His smoking use included cigarettes. He has a 15.00 pack-year smoking history. He has never used smokeless tobacco.    Urine Incontinence: Patient reports that he is not currently experiencing any symptoms of urinary incontinence.    Assessment / Plan      Assessment/Plan:   66 y.o. male who presented today for follow up of his prostate cancer and LUTS.  He underwent TURP and is doing well from his LUTS.  He did not get his radiation therapy.  He also stopped his ADT.  He states he feels well and does not feel like he needs his  cancer treated.  He is taking supplements and feels that he would like to just continue his supplements.  I again had an extremely long conversation with him and his wife today in regards to his prostate cancer.  I discussed that there is no evidence that his supplements will treat his cancer.  I also discussed that while he is feeling good at the moment his cancer continues to spread without treatment.  After our discussion they are agreeable to restarting androgen deprivation therapy.  I will write him Casodex today.  I will have him follow-up in 1 to 2 weeks for his first Lupron shot.  They will discuss radiation therapy and make a decision at his next visit.  I again discussed surgery with him but he is also reluctant for this as well.      Diagnoses and all orders for this visit:    1. Prostate cancer (HCC) (Primary)  -     bicalutamide (CASODEX) 50 MG chemo tablet; Take 1 tablet by mouth Daily.  Dispense: 90 tablet; Refill: 0           Follow Up:   Return in about 2 weeks (around 6/17/2022).    I spent approximately 60 minutes providing clinical care for this patient; including review of patient's chart and provider documentation, face to face time spent with patient in examination room (obtaining history, performing physical exam, discussing diagnosis and management options), placing orders, and completing patient documentation.     Kavon Delgado MD  [facility]

## 2022-06-10 ENCOUNTER — TELEPHONE (OUTPATIENT)
Dept: UROLOGY | Facility: CLINIC | Age: 67
End: 2022-06-10

## 2022-06-10 NOTE — TELEPHONE ENCOUNTER
Call ref# [id number] BRANDEN has been approved through [facility] insurance auth #[id number] approved from 6/3/2022-6/3/2023

## 2022-06-17 ENCOUNTER — OFFICE VISIT (OUTPATIENT)
Dept: UROLOGY | Facility: CLINIC | Age: 67
End: 2022-06-17

## 2022-06-17 VITALS — HEIGHT: 72 IN | BODY MASS INDEX: 20.87 KG/M2 | WEIGHT: 154.1 LBS

## 2022-06-17 DIAGNOSIS — C61 PROSTATE CANCER: Primary | ICD-10-CM

## 2022-06-17 PROCEDURE — 99214 OFFICE O/P EST MOD 30 MIN: CPT | Performed by: UROLOGY

## 2022-06-17 PROCEDURE — 96402 CHEMO HORMON ANTINEOPL SQ/IM: CPT | Performed by: UROLOGY

## 2022-06-17 NOTE — PROGRESS NOTES
Follow Up Office Visit      Patient Name: David Frias  : 1955   MRN: [id number]     Chief Complaint:    Chief Complaint   Patient presents with   • Prostate Cancer       Referring Provider: No ref. provider found    History of Present Illness: David Frias is a 66 y.o. male who presents today for follow up of his high risk prostate cancer.  He is here today for his Lupron shot.  He has been on casodex for the past 2 weeks.  He reports he is now agreeable to radiation.  No dysuria or gross hematuria.  Reports he is overall doing well.  No new bone pain or discomfort.     Subjective      Review of System: Review of Systems   Constitutional: Negative for chills, fatigue, fever and unexpected weight change.   HENT: Negative for congestion, rhinorrhea and sore throat.    Eyes: Negative for visual disturbance.   Respiratory: Negative for apnea, cough, chest tightness and shortness of breath.    Cardiovascular: Negative for chest pain.   Gastrointestinal: Negative for abdominal pain, constipation, diarrhea, nausea and vomiting.   Endocrine: Negative for polydipsia and polyuria.   Genitourinary: Negative for difficulty urinating, dysuria, enuresis, flank pain, frequency, genital sores, hematuria and urgency.   Musculoskeletal: Negative for gait problem.   Skin: Negative for rash and wound.   Allergic/Immunologic: Negative for immunocompromised state.   Neurological: Negative for dizziness, tremors, syncope, weakness and light-headedness.   Hematological: Does not bruise/bleed easily.      I have reviewed the ROS documented by my clinical staff, I have updated appropriately and I agree. Kavon Delgado MD    I have reviewed and the following portions of the patient's history were updated as appropriate: past family history, past medical history, past social history, past surgical history and problem list.    Past Medical History:   Past Medical History:   Diagnosis Date   • Elevated cholesterol    • Hypertension      but refuses to take medication as he had a drop in blood pressure once   • Prostate cancer (HCC) 12/26/2021       Past Surgical History:  Past Surgical History:   Procedure Laterality Date   • ABDOMINAL SURGERY     • CHOLECYSTECTOMY     • CHOLECYSTECTOMY WITH INTRAOPERATIVE CHOLANGIOGRAM N/A 8/3/2016    Procedure: CHOLECYSTECTOMY LAPAROSCOPIC INTRAOPERATIVE CHOLANGIOGRAM;  Surgeon: Garrett Barnes MD;  Location: [facility];  Service:    • CYSTOSCOPY TRANSURETHRAL RESECTION OF PROSTATE N/A 12/15/2021    Procedure: Cystoscopy and transurethral resection of the prostate;  Surgeon: Manjit Serrano MD;  Location: [facility] OR;  Service: Urology;  Laterality: N/A;   • FRACTURE SURGERY  2015    ORIF RIGHT LEG    • HAND SURGERY Right     SECOND DIGIT AT FIRST AND SECOND JOINT SURGICAL REPAIR RELATED TO INJURY   • LEG SURGERY         Family History:   family history includes Cancer in his sister; Depression in his brother; Heart disease in his paternal grandmother; Hypertension in his sister.   Otherwise pertinent FHx was reviewed and not pertinent to current issue.    Social History:    reports that he has quit smoking. His smoking use included cigarettes. He has a 15.00 pack-year smoking history. He has never used smokeless tobacco. He reports that he does not drink alcohol and does not use drugs.    Medications:     Current Outpatient Medications:   •  bicalutamide (CASODEX) 50 MG chemo tablet, Take 1 tablet by mouth Daily., Disp: 90 tablet, Rfl: 0  •  cholecalciferol (VITAMIN D3) 1000 units tablet, Take 1,000 Units by mouth Daily., Disp: , Rfl:   •  ciprofloxacin (Cipro) 500 MG tablet, Take 1 tablet by mouth 2 (Two) Times a Day., Disp: 6 tablet, Rfl: 0  •  finasteride (PROSCAR) 5 MG tablet, Take 1 tablet by mouth Daily., Disp: 30 tablet, Rfl: 5  •  HYDROcodone-acetaminophen (NORCO)  MG per tablet, Take 1 tablet by mouth Every 4 (Four) Hours As Needed for Moderate Pain., Disp: 12 tablet, Rfl: 0  •   "Lecithin-I-B6-Cider Vinegar (Lecithin-Kelp-B6-Cider Vinegar) 200-0.075-5-50 MG capsule, Take 1 capsule by mouth Daily., Disp: , Rfl:   •  lisinopril (PRINIVIL,ZESTRIL) 20 MG tablet, Take 20 mg by mouth Daily., Disp: , Rfl: 2  •  Omega-3 Fatty Acids (fish oil) 1000 MG capsule capsule, Take 1,000 mg by mouth Daily With Breakfast., Disp: , Rfl:   •  Specialty Vitamins Products (JOINT/BONE VITALITY PO), Take 1 tablet by mouth Daily., Disp: , Rfl:   •  tamsulosin (Flomax) 0.4 MG capsule 24 hr capsule, Take 1 capsule by mouth Every Night., Disp: 30 capsule, Rfl: 5    Allergies:   No Known Allergies      Objective     Physical Exam:   Vital Signs:   Vitals:    06/17/22 0856   Weight: 69.9 kg (154 lb 1.6 oz)   Height: 182.9 cm (72.01\")     Body mass index is 20.9 kg/m².     Physical Exam  Vitals and nursing note reviewed.   Constitutional:       General: He is awake. He is not in acute distress.     Appearance: Normal appearance. He is well-developed.   HENT:      Head: Normocephalic and atraumatic.      Right Ear: External ear normal.      Left Ear: External ear normal.      Nose: Nose normal.   Eyes:      Conjunctiva/sclera: Conjunctivae normal.   Pulmonary:      Effort: Pulmonary effort is normal.   Abdominal:      General: There is no distension.      Palpations: Abdomen is soft. There is no mass.      Tenderness: There is no abdominal tenderness. There is no right CVA tenderness, left CVA tenderness, guarding or rebound.      Hernia: No hernia is present. There is no hernia in the left inguinal area or right inguinal area.   Genitourinary:     Pubic Area: No rash.       Rectum: No mass or tenderness. Normal anal tone.   Musculoskeletal:      Cervical back: Normal range of motion.   Lymphadenopathy:      Lower Body: No right inguinal adenopathy. No left inguinal adenopathy.   Skin:     General: Skin is warm.   Neurological:      General: No focal deficit present.      Mental Status: He is alert and oriented to person, " place, and time.   Psychiatric:         Behavior: Behavior normal. Behavior is cooperative.         Labs:   Brief Urine Lab Results  (Last result in the past 365 days)      Color   Clarity   Blood   Leuk Est   Nitrite   Protein   CREAT   Urine HCG        12/18/21 1640 Orange  Comment: Dipstick results may be inaccurate due to color interference.    Turbid   Large (3+)   Moderate (2+)   Negative   >=300 mg/dL (3+)                 Urine Culture    Urine Culture 12/18/21   Urine Culture <25,000 CFU/mL Normal Urogenital Sally              Lab Results   Component Value Date    GLUCOSE 138 (H) 12/18/2021    CALCIUM 9.5 12/18/2021     (L) 12/18/2021    K 3.5 12/18/2021    CO2 26.1 12/18/2021    CL 97 (L) 12/18/2021    BUN 16 12/18/2021    CREATININE 1.07 12/18/2021    EGFRIFNONA 69 12/18/2021    BCR 15.0 12/18/2021    ANIONGAP 10.9 12/18/2021       Lab Results   Component Value Date    WBC 18.53 (H) 12/18/2021    HGB 15.3 12/18/2021    HCT 44.7 12/18/2021    .4 (H) 12/18/2021     12/18/2021       Lab Results   Component Value Date    PSA 11.500 (H) 03/04/2022    PSA 3.540 04/03/2018       Images:   No Images in the past 120 days found..    Pathology:      Measures:   Tobacco:   David Frias  reports that he has quit smoking. His smoking use included cigarettes. He has a 15.00 pack-year smoking history. He has never used smokeless tobacco..    Urine Incontinence: Patient reports that he is not currently experiencing any symptoms of urinary incontinence.      Assessment / Plan      Assessment/Plan:   66 y.o. male who presented today for follow up of high risk prostate cancer.  After a extremely long discussion during his last visit he is agreeable to radiation.  We started casodex at his last visit and he is here for his first Lupron injection.  We will arrange a new appointment for rad oncology.  I will have him follow up in 6 months for his next injection.      Diagnoses and all orders for this  visit:    1. Prostate cancer (HCC) (Primary)           Follow Up:   Return in about 6 months (around 12/17/2022).    I spent approximately 30 minutes providing clinical care for this patient; including review of patient's chart and provider documentation, face to face time spent with patient in examination room (obtaining history, performing physical exam, discussing diagnosis and management options), placing orders, and completing patient documentation.     Kavon Delgado MD  [facility]

## 2022-06-20 ENCOUNTER — OFFICE VISIT (OUTPATIENT)
Dept: RADIATION ONCOLOGY | Facility: HOSPITAL | Age: 67
End: 2022-06-20

## 2022-06-20 ENCOUNTER — LAB (OUTPATIENT)
Dept: LAB | Facility: HOSPITAL | Age: 67
End: 2022-06-20

## 2022-06-20 DIAGNOSIS — C61 PROSTATE CANCER: ICD-10-CM

## 2022-06-20 LAB
FLUAV SUBTYP SPEC NAA+PROBE: NOT DETECTED
FLUBV RNA ISLT QL NAA+PROBE: NOT DETECTED
SARS-COV-2 RNA PNL SPEC NAA+PROBE: NOT DETECTED

## 2022-06-20 PROCEDURE — 99215 OFFICE O/P EST HI 40 MIN: CPT | Performed by: RADIOLOGY

## 2022-06-20 PROCEDURE — G0463 HOSPITAL OUTPT CLINIC VISIT: HCPCS | Performed by: RADIOLOGY

## 2022-06-20 PROCEDURE — 87636 SARSCOV2 & INF A&B AMP PRB: CPT | Performed by: RADIOLOGY

## 2022-06-20 NOTE — PROGRESS NOTES
Follow Up Office Visit    Patient Name: David Frias  : 1955   MRN: [id number]  Chief Complaint: High Risk prostate Cancer   Assessment/Plan: Mr. Frias is a 66 y.o. white male with high risk prostate cancer, PSA 11.5, TURP showed [place] 9 (4+5), GG5 prostate cancer. He was supposed to start ADT but after some delays, he started Lupron Shot last week, currently on Casodex. His CT CAP and bone scan which were negative for regional or distant metastases. He initially presented with urinary complaints and subsequently underwent a TURP in December. His TURP PSA was 3.5 (on finasteride). This showed Grade Group 5 disease. I again had a very long conversation with him and his wife today in regards to his prostate cancer. I discussed the importance of XRT and ADT. After our discussion they are agreeable to restarting androgen deprivation therapy and XRT. He was seen by Dr. Mckenzie 3 months ago however the patient declined ADT and XRT at that time. I spent more than 60 minutes with the patient discussing his diagnosis, prognosis, treatment recommendations and treatment options. More than 50% of the time, spent was in face to face conversation and coordinating his care. He agreed to proceed with our treatment recommendation in the form of:  1. CT simulation on 22  2. Start definitive IMRT on 22  3. My plan is to deliver a total dose of 81 Gy to the prostate and seminal vesicles at the rate of 1.8 Gy per fraction x 45 daily fractions using 6X MV photon IMRT.  4. He will receive 54 Gy initially to the pelvic lymph nodes, seminal vesicles, and prostate at the rate of 1.8 Gy per fraction x 30 fractions. This will be followed by a boost of 27 Gy to the seminal vesicles and prostate.   Digital speech recognition software was used to dictate this note and, despite all efforts to proofread, some dictation errors may occur.   If you have any questions, please do not hesitate to contact me.  Thank you  for allowing me to participate in this pleasant patient’s care  Qiana Bañuelos MD    History of Present Illness: David Frias is a 66 y.o. male who presents today for follow up of high risk prostate cancer radiation therapy revaluation. He was instructed to start ADT and radiation therapy.  However, he states he does not want radiation.  He has not been on his casodex or Lupron. He reports he feels great.  He does not understand why he needs treated because he feels great.    No dysuria or gross hematuria.  Reports he is urinating well.    Review of System:   Constitutional: Negative for chills, fatigue, fever and unexpected weight change.   HENT: Negative for congestion, rhinorrhea and sore throat.    Eyes: Negative for visual disturbance.   Respiratory: Negative for apnea, cough, chest tightness and shortness of breath.    Cardiovascular: Negative for chest pain.   Gastrointestinal: Negative for abdominal pain, constipation, diarrhea, nausea and vomiting.   Endocrine: Negative for polydipsia and polyuria.   Genitourinary: Negative for difficulty urinating, dysuria, enuresis, flank pain, frequency, genital sores, hematuria and urgency.   Musculoskeletal: Negative for gait problem.   Skin: Negative for rash and wound.   Allergic/Immunologic: Negative for immunocompromised state.   Neurological: Negative for dizziness, tremors, syncope, weakness and light-headedness.   Hematological: Does not bruise/bleed easily.   Past Medical History:   Medical History[]Expand by Default        Past Medical History:   Diagnosis Date   • Elevated cholesterol     • Hypertension       but refuses to take medication as he had a drop in blood pressure once   • Prostate cancer (HCC) 12/26/2021      Past Surgical History:  Surgical History[]Expand by Default         Past Surgical History:   Procedure Laterality Date   • ABDOMINAL SURGERY       • CHOLECYSTECTOMY       • CHOLECYSTECTOMY WITH INTRAOPERATIVE CHOLANGIOGRAM N/A 8/3/2016      Procedure: CHOLECYSTECTOMY LAPAROSCOPIC INTRAOPERATIVE CHOLANGIOGRAM;  Surgeon: Garrett Barnes MD;  Location: [facility] OR;  Service:    • CYSTOSCOPY TRANSURETHRAL RESECTION OF PROSTATE N/A 12/15/2021     Procedure: Cystoscopy and transurethral resection of the prostate;  Surgeon: Manjit eSrrano MD;  Location: [facility] OR;  Service: Urology;  Laterality: N/A;   • FRACTURE SURGERY   2015     ORIF RIGHT LEG    • HAND SURGERY Right       SECOND DIGIT AT FIRST AND SECOND JOINT SURGICAL REPAIR RELATED TO INJURY   • LEG SURGERY          Family History: family history includes Cancer in his sister; Depression in his brother; Heart disease in his paternal grandmother; Hypertension in his sister.   Otherwise pertinent FHx was reviewed and not pertinent to current issue.  Social History: reports that he has quit smoking. His smoking use included cigarettes. He has a 15.00 pack-year smoking history. He has never used smokeless tobacco. He reports that he does not drink alcohol and does not use drugs.  Medications:   •  bicalutamide (CASODEX) 50 MG chemo tablet, Take 1 tablet by mouth Daily., Disp: 90 tablet, Rfl: 0  •  cholecalciferol (VITAMIN D3) 1000 units tablet, Take 1,000 Units by mouth Daily., Disp: , Rfl:   •  ciprofloxacin (Cipro) 500 MG tablet, Take 1 tablet by mouth 2 (Two) Times a Day., Disp: 6 tablet, Rfl: 0  •  finasteride (PROSCAR) 5 MG tablet, Take 1 tablet by mouth Daily., Disp: 30 tablet, Rfl: 5  •  HYDROcodone-acetaminophen (NORCO)  MG per tablet, Take 1 tablet by mouth Every 4 (Four) Hours As Needed for Moderate Pain., Disp: 12 tablet, Rfl: 0  •  Lecithin-I-B6-Cider Vinegar (Lecithin-Kelp-B6-Cider Vinegar) 200-0.075-5-50 MG capsule, Take 1 capsule by mouth Daily., Disp: , Rfl:   •  lisinopril (PRINIVIL,ZESTRIL) 20 MG tablet, Take 20 mg by mouth Daily., Disp: , Rfl: 2  •  Omega-3 Fatty Acids (fish oil) 1000 MG capsule capsule, Take 1,000 mg by mouth Daily With Breakfast., Disp: , Rfl:   •   Specialty Vitamins Products (JOINT/BONE VITALITY PO), Take 1 tablet by mouth Daily., Disp: , Rfl:   •  tamsulosin (Flomax) 0.4 MG capsule 24 hr capsule, Take 1 capsule by mouth Every Night., Disp: 30 capsule, Rfl: 5  Allergies: No Known Allergies   Physical Exam  Constitutional: ECOG 0   He is awake. He is not in acute distress. Normal appearance. He is well-developed.   HENT:      Head: Normocephalic and atraumatic.      Right Ear: External ear normal.      Left Ear: External ear normal.      Nose: Nose normal.   Eyes: Conjunctiva/sclera: Conjunctivae normal.   Pulmonary: Effort: Pulmonary effort is normal.   Abdominal: No distension.      Palpations: Abdomen is soft. There is no mass.      Tenderness: There is no abdominal tenderness. There is no right CVA tenderness, left CVA tenderness, guarding or rebound.No hernia is present.  Musculoskeletal: Normal range of motion.   Lymphadenopathy:  No LA.   Skin:   General: Skin is warm.   Neurological: General: No focal deficit present.      Mental Status: He is alert and oriented to person, place, and time.   Psychiatric:Behavior: Behavior normal. Behavior is cooperative.

## 2022-06-21 ENCOUNTER — APPOINTMENT (OUTPATIENT)
Dept: RADIATION ONCOLOGY | Facility: HOSPITAL | Age: 67
End: 2022-06-21

## 2022-06-21 PROCEDURE — 77334 RADIATION TREATMENT AID(S): CPT | Performed by: RADIOLOGY

## 2022-06-28 PROCEDURE — 77338 DESIGN MLC DEVICE FOR IMRT: CPT | Performed by: RADIOLOGY

## 2022-06-28 PROCEDURE — 77301 RADIOTHERAPY DOSE PLAN IMRT: CPT | Performed by: RADIOLOGY

## 2022-06-28 PROCEDURE — 77300 RADIATION THERAPY DOSE PLAN: CPT | Performed by: RADIOLOGY

## 2022-07-01 ENCOUNTER — APPOINTMENT (OUTPATIENT)
Dept: RADIATION ONCOLOGY | Facility: HOSPITAL | Age: 67
End: 2022-07-01

## 2022-07-06 LAB
RAD ONC ARIA COURSE ID: NORMAL
RAD ONC ARIA COURSE INTENT: NORMAL
RAD ONC ARIA COURSE LAST TREATMENT DATE: NORMAL
RAD ONC ARIA COURSE START DATE: NORMAL
RAD ONC ARIA COURSE TREATMENT ELAPSED DAYS: 0
RAD ONC ARIA FIRST TREATMENT DATE: NORMAL
RAD ONC ARIA PLAN FRACTIONS TREATED TO DATE: 1
RAD ONC ARIA PLAN ID: NORMAL
RAD ONC ARIA PLAN PRESCRIBED DOSE PER FRACTION: 1.8 GY
RAD ONC ARIA PLAN PRIMARY REFERENCE POINT: NORMAL
RAD ONC ARIA PLAN TOTAL FRACTIONS PRESCRIBED: 25
RAD ONC ARIA PLAN TOTAL PRESCRIBED DOSE: 4500 CGY
RAD ONC ARIA REFERENCE POINT DOSAGE GIVEN TO DATE: 1.8 GY
RAD ONC ARIA REFERENCE POINT ID: NORMAL
RAD ONC ARIA REFERENCE POINT SESSION DOSAGE GIVEN: 1.8 GY

## 2022-07-06 PROCEDURE — 77014 CHG CT GUIDANCE RADIATION THERAPY FLDS PLACEMENT: CPT | Performed by: RADIOLOGY

## 2022-07-06 PROCEDURE — 77427 RADIATION TX MANAGEMENT X5: CPT | Performed by: RADIOLOGY

## 2022-07-06 PROCEDURE — 77385: CPT | Performed by: RADIOLOGY

## 2022-07-07 LAB
RAD ONC ARIA COURSE ID: NORMAL
RAD ONC ARIA COURSE INTENT: NORMAL
RAD ONC ARIA COURSE LAST TREATMENT DATE: NORMAL
RAD ONC ARIA COURSE START DATE: NORMAL
RAD ONC ARIA COURSE TREATMENT ELAPSED DAYS: 1
RAD ONC ARIA FIRST TREATMENT DATE: NORMAL
RAD ONC ARIA PLAN FRACTIONS TREATED TO DATE: 2
RAD ONC ARIA PLAN ID: NORMAL
RAD ONC ARIA PLAN PRESCRIBED DOSE PER FRACTION: 1.8 GY
RAD ONC ARIA PLAN PRIMARY REFERENCE POINT: NORMAL
RAD ONC ARIA PLAN TOTAL FRACTIONS PRESCRIBED: 25
RAD ONC ARIA PLAN TOTAL PRESCRIBED DOSE: 4500 CGY
RAD ONC ARIA REFERENCE POINT DOSAGE GIVEN TO DATE: 3.6 GY
RAD ONC ARIA REFERENCE POINT ID: NORMAL
RAD ONC ARIA REFERENCE POINT SESSION DOSAGE GIVEN: 1.8 GY

## 2022-07-07 PROCEDURE — 77014 CHG CT GUIDANCE RADIATION THERAPY FLDS PLACEMENT: CPT | Performed by: RADIOLOGY

## 2022-07-07 PROCEDURE — 77385: CPT | Performed by: RADIOLOGY

## 2022-07-08 LAB
RAD ONC ARIA COURSE ID: NORMAL
RAD ONC ARIA COURSE INTENT: NORMAL
RAD ONC ARIA COURSE LAST TREATMENT DATE: NORMAL
RAD ONC ARIA COURSE START DATE: NORMAL
RAD ONC ARIA COURSE TREATMENT ELAPSED DAYS: 2
RAD ONC ARIA FIRST TREATMENT DATE: NORMAL
RAD ONC ARIA PLAN FRACTIONS TREATED TO DATE: 3
RAD ONC ARIA PLAN ID: NORMAL
RAD ONC ARIA PLAN PRESCRIBED DOSE PER FRACTION: 1.8 GY
RAD ONC ARIA PLAN PRIMARY REFERENCE POINT: NORMAL
RAD ONC ARIA PLAN TOTAL FRACTIONS PRESCRIBED: 25
RAD ONC ARIA PLAN TOTAL PRESCRIBED DOSE: 4500 CGY
RAD ONC ARIA REFERENCE POINT DOSAGE GIVEN TO DATE: 5.4 GY
RAD ONC ARIA REFERENCE POINT ID: NORMAL
RAD ONC ARIA REFERENCE POINT SESSION DOSAGE GIVEN: 1.8 GY

## 2022-07-08 PROCEDURE — 77014 CHG CT GUIDANCE RADIATION THERAPY FLDS PLACEMENT: CPT | Performed by: RADIOLOGY

## 2022-07-08 PROCEDURE — 77385: CPT | Performed by: RADIOLOGY

## 2022-07-09 PROCEDURE — 77336 RADIATION PHYSICS CONSULT: CPT | Performed by: RADIOLOGY

## 2022-07-11 LAB
RAD ONC ARIA COURSE ID: NORMAL
RAD ONC ARIA COURSE INTENT: NORMAL
RAD ONC ARIA COURSE LAST TREATMENT DATE: NORMAL
RAD ONC ARIA COURSE START DATE: NORMAL
RAD ONC ARIA COURSE TREATMENT ELAPSED DAYS: 5
RAD ONC ARIA FIRST TREATMENT DATE: NORMAL
RAD ONC ARIA PLAN FRACTIONS TREATED TO DATE: 4
RAD ONC ARIA PLAN ID: NORMAL
RAD ONC ARIA PLAN PRESCRIBED DOSE PER FRACTION: 1.8 GY
RAD ONC ARIA PLAN PRIMARY REFERENCE POINT: NORMAL
RAD ONC ARIA PLAN TOTAL FRACTIONS PRESCRIBED: 25
RAD ONC ARIA PLAN TOTAL PRESCRIBED DOSE: 4500 CGY
RAD ONC ARIA REFERENCE POINT DOSAGE GIVEN TO DATE: 7.2 GY
RAD ONC ARIA REFERENCE POINT ID: NORMAL
RAD ONC ARIA REFERENCE POINT SESSION DOSAGE GIVEN: 1.8 GY

## 2022-07-11 PROCEDURE — 77014 CHG CT GUIDANCE RADIATION THERAPY FLDS PLACEMENT: CPT | Performed by: RADIOLOGY

## 2022-07-11 PROCEDURE — 77385: CPT | Performed by: RADIOLOGY

## 2022-07-12 ENCOUNTER — RADIATION ONCOLOGY WEEKLY ASSESSMENT (OUTPATIENT)
Dept: RADIATION ONCOLOGY | Facility: HOSPITAL | Age: 67
End: 2022-07-12

## 2022-07-12 VITALS
SYSTOLIC BLOOD PRESSURE: 152 MMHG | HEART RATE: 66 BPM | TEMPERATURE: 97.7 F | RESPIRATION RATE: 18 BRPM | DIASTOLIC BLOOD PRESSURE: 82 MMHG | OXYGEN SATURATION: 98 %

## 2022-07-12 DIAGNOSIS — C61 PROSTATE CANCER: Primary | ICD-10-CM

## 2022-07-12 LAB
RAD ONC ARIA COURSE ID: NORMAL
RAD ONC ARIA COURSE INTENT: NORMAL
RAD ONC ARIA COURSE LAST TREATMENT DATE: NORMAL
RAD ONC ARIA COURSE START DATE: NORMAL
RAD ONC ARIA COURSE TREATMENT ELAPSED DAYS: 6
RAD ONC ARIA FIRST TREATMENT DATE: NORMAL
RAD ONC ARIA PLAN FRACTIONS TREATED TO DATE: 5
RAD ONC ARIA PLAN ID: NORMAL
RAD ONC ARIA PLAN PRESCRIBED DOSE PER FRACTION: 1.8 GY
RAD ONC ARIA PLAN PRIMARY REFERENCE POINT: NORMAL
RAD ONC ARIA PLAN TOTAL FRACTIONS PRESCRIBED: 25
RAD ONC ARIA PLAN TOTAL PRESCRIBED DOSE: 4500 CGY
RAD ONC ARIA REFERENCE POINT DOSAGE GIVEN TO DATE: 9 GY
RAD ONC ARIA REFERENCE POINT ID: NORMAL
RAD ONC ARIA REFERENCE POINT SESSION DOSAGE GIVEN: 1.8 GY

## 2022-07-12 PROCEDURE — 77385: CPT | Performed by: RADIOLOGY

## 2022-07-12 PROCEDURE — 77014 CHG CT GUIDANCE RADIATION THERAPY FLDS PLACEMENT: CPT | Performed by: RADIOLOGY

## 2022-07-12 NOTE — PROGRESS NOTES
On Treatment Visit Note      Patient Name: David Frias  : 1955   MRN: [id number]     Diagnosis: Prostate Cancer    This patient was seen today for an on treatment visit.    Radiation Treatments     Active   Plans   Pros+LN+SV   Most recent treatment: Dose planned: 180 cGy (fraction 5 on 2022)   Total: Dose planned: 4,500 cGy (25 fractions)   Elapsed Days: 6      Reference Points   PTV_45   Most recent treatment: Dose given: 180 cGy (on 2022)   Total: Dose given: 900 cGy   Elapsed Days: 6                 Subjective      Review of Systems:   Review of Systems   Constitutional: Positive for fatigue.   Gastrointestinal: Negative for diarrhea.   Genitourinary: Negative for dysuria, frequency, hematuria and urinary incontinence.   Skin: Negative for color change.   All other systems reviewed and are negative.      Medications:     Current Outpatient Medications:   •  bicalutamide (CASODEX) 50 MG chemo tablet, Take 1 tablet by mouth Daily., Disp: 90 tablet, Rfl: 0  •  cholecalciferol (VITAMIN D3) 1000 units tablet, Take 1,000 Units by mouth Daily., Disp: , Rfl:   •  ciprofloxacin (Cipro) 500 MG tablet, Take 1 tablet by mouth 2 (Two) Times a Day., Disp: 6 tablet, Rfl: 0  •  finasteride (PROSCAR) 5 MG tablet, Take 1 tablet by mouth Daily., Disp: 30 tablet, Rfl: 5  •  HYDROcodone-acetaminophen (NORCO)  MG per tablet, Take 1 tablet by mouth Every 4 (Four) Hours As Needed for Moderate Pain., Disp: 12 tablet, Rfl: 0  •  Lecithin-I-B6-Cider Vinegar (Lecithin-Kelp-B6-Cider Vinegar) 200-0.075-5-50 MG capsule, Take 1 capsule by mouth Daily., Disp: , Rfl:   •  lisinopril (PRINIVIL,ZESTRIL) 20 MG tablet, Take 20 mg by mouth Daily., Disp: , Rfl: 2  •  Omega-3 Fatty Acids (fish oil) 1000 MG capsule capsule, Take 1,000 mg by mouth Daily With Breakfast., Disp: , Rfl:   •  Specialty Vitamins Products (JOINT/BONE VITALITY PO), Take 1 tablet by mouth Daily., Disp: , Rfl:   •  tamsulosin (Flomax) 0.4 MG capsule 24  hr capsule, Take 1 capsule by mouth Every Night., Disp: 30 capsule, Rfl: 5    Allergies:   No Known Allergies    Objective     Physical Exam:  Physical Exam  Vitals reviewed.   Constitutional:       Appearance: Normal appearance.   Cardiovascular:      Rate and Rhythm: Normal rate and regular rhythm.      Pulses: Normal pulses.      Heart sounds: Normal heart sounds.   Pulmonary:      Effort: Pulmonary effort is normal.      Breath sounds: Normal breath sounds.   Skin:     General: Skin is warm and dry.   Neurological:      General: No focal deficit present.      Mental Status: He is alert and oriented to person, place, and time. Mental status is at baseline.   Psychiatric:         Mood and Affect: Mood normal.         Behavior: Behavior normal.         Thought Content: Thought content normal.         Vital Signs:   Vitals:    07/12/22 1010   BP: 152/82   Pulse: 66   Resp: 18   Temp: 97.7 °F (36.5 °C)   TempSrc: Temporal   SpO2: 98%   PainSc: 0-No pain     There is no height or weight on file to calculate BMI.     Current Total XRT Dose (cGY):    Radiation Treatments     Active   Plans   Pros+LN+SV   Most recent treatment: Dose planned: 180 cGy (fraction 5 on 7/12/2022)   Total: Dose planned: 4,500 cGy (25 fractions)   Elapsed Days: 6      Reference Points   PTV_45   Most recent treatment: Dose given: 180 cGy (on 7/12/2022)   Total: Dose given: 900 cGy   Elapsed Days: 6                 Plan      Plan:   Patient was seen today for an on treatment visit. Patient is receiving radiation therapy to the prostate. Patient is stable and tolerating radiation therapy well with minimal side effects.  His only complaint today is fatigue, instructed him that this is normal.  No other issues today.  Continue with radiation therapy.     I have reviewed treatment setup notes, checked and approved the daily guidance images. I reviewed dose delivery, treatment parameters and deemed them appropriate. We plan to continue radiation  therapy as prescribed.     I, Baldo Mckenzie MD, personally performed the services described in this documentation as scribed by the above named individual in my presence, and it is both accurate and complete.  7/12/2022  10:20 EDT     Electronically signed by Baldo Mckenzie MD, 07/12/22, 10:20 AM EDT.    Scribed for Dr. Baldo Mckenzie MD by ROSEMARIE Willis 7/12/2022  10:15 EDT

## 2022-07-13 LAB
RAD ONC ARIA COURSE ID: NORMAL
RAD ONC ARIA COURSE INTENT: NORMAL
RAD ONC ARIA COURSE LAST TREATMENT DATE: NORMAL
RAD ONC ARIA COURSE START DATE: NORMAL
RAD ONC ARIA COURSE TREATMENT ELAPSED DAYS: 7
RAD ONC ARIA FIRST TREATMENT DATE: NORMAL
RAD ONC ARIA PLAN FRACTIONS TREATED TO DATE: 6
RAD ONC ARIA PLAN ID: NORMAL
RAD ONC ARIA PLAN PRESCRIBED DOSE PER FRACTION: 1.8 GY
RAD ONC ARIA PLAN PRIMARY REFERENCE POINT: NORMAL
RAD ONC ARIA PLAN TOTAL FRACTIONS PRESCRIBED: 25
RAD ONC ARIA PLAN TOTAL PRESCRIBED DOSE: 4500 CGY
RAD ONC ARIA REFERENCE POINT DOSAGE GIVEN TO DATE: 10.8 GY
RAD ONC ARIA REFERENCE POINT ID: NORMAL
RAD ONC ARIA REFERENCE POINT SESSION DOSAGE GIVEN: 1.8 GY

## 2022-07-13 PROCEDURE — 77385: CPT | Performed by: RADIOLOGY

## 2022-07-13 PROCEDURE — 77336 RADIATION PHYSICS CONSULT: CPT | Performed by: RADIOLOGY

## 2022-07-13 PROCEDURE — 77014 CHG CT GUIDANCE RADIATION THERAPY FLDS PLACEMENT: CPT | Performed by: RADIOLOGY

## 2022-07-13 PROCEDURE — 77427 RADIATION TX MANAGEMENT X5: CPT | Performed by: RADIOLOGY

## 2022-07-14 LAB
RAD ONC ARIA COURSE ID: NORMAL
RAD ONC ARIA COURSE INTENT: NORMAL
RAD ONC ARIA COURSE LAST TREATMENT DATE: NORMAL
RAD ONC ARIA COURSE START DATE: NORMAL
RAD ONC ARIA COURSE TREATMENT ELAPSED DAYS: 8
RAD ONC ARIA FIRST TREATMENT DATE: NORMAL
RAD ONC ARIA PLAN FRACTIONS TREATED TO DATE: 7
RAD ONC ARIA PLAN ID: NORMAL
RAD ONC ARIA PLAN PRESCRIBED DOSE PER FRACTION: 1.8 GY
RAD ONC ARIA PLAN PRIMARY REFERENCE POINT: NORMAL
RAD ONC ARIA PLAN TOTAL FRACTIONS PRESCRIBED: 25
RAD ONC ARIA PLAN TOTAL PRESCRIBED DOSE: 4500 CGY
RAD ONC ARIA REFERENCE POINT DOSAGE GIVEN TO DATE: 12.6 GY
RAD ONC ARIA REFERENCE POINT ID: NORMAL
RAD ONC ARIA REFERENCE POINT SESSION DOSAGE GIVEN: 1.8 GY

## 2022-07-14 PROCEDURE — 77385: CPT | Performed by: RADIOLOGY

## 2022-07-14 PROCEDURE — 77014 CHG CT GUIDANCE RADIATION THERAPY FLDS PLACEMENT: CPT | Performed by: RADIOLOGY

## 2022-07-15 LAB
RAD ONC ARIA COURSE ID: NORMAL
RAD ONC ARIA COURSE INTENT: NORMAL
RAD ONC ARIA COURSE LAST TREATMENT DATE: NORMAL
RAD ONC ARIA COURSE START DATE: NORMAL
RAD ONC ARIA COURSE TREATMENT ELAPSED DAYS: 9
RAD ONC ARIA FIRST TREATMENT DATE: NORMAL
RAD ONC ARIA PLAN FRACTIONS TREATED TO DATE: 8
RAD ONC ARIA PLAN ID: NORMAL
RAD ONC ARIA PLAN PRESCRIBED DOSE PER FRACTION: 1.8 GY
RAD ONC ARIA PLAN PRIMARY REFERENCE POINT: NORMAL
RAD ONC ARIA PLAN TOTAL FRACTIONS PRESCRIBED: 25
RAD ONC ARIA PLAN TOTAL PRESCRIBED DOSE: 4500 CGY
RAD ONC ARIA REFERENCE POINT DOSAGE GIVEN TO DATE: 14.4 GY
RAD ONC ARIA REFERENCE POINT ID: NORMAL
RAD ONC ARIA REFERENCE POINT SESSION DOSAGE GIVEN: 1.8 GY

## 2022-07-15 PROCEDURE — 77014 CHG CT GUIDANCE RADIATION THERAPY FLDS PLACEMENT: CPT | Performed by: RADIOLOGY

## 2022-07-15 PROCEDURE — 77385: CPT | Performed by: RADIOLOGY

## 2022-07-18 LAB
RAD ONC ARIA COURSE ID: NORMAL
RAD ONC ARIA COURSE INTENT: NORMAL
RAD ONC ARIA COURSE LAST TREATMENT DATE: NORMAL
RAD ONC ARIA COURSE START DATE: NORMAL
RAD ONC ARIA COURSE TREATMENT ELAPSED DAYS: 12
RAD ONC ARIA FIRST TREATMENT DATE: NORMAL
RAD ONC ARIA PLAN FRACTIONS TREATED TO DATE: 9
RAD ONC ARIA PLAN ID: NORMAL
RAD ONC ARIA PLAN PRESCRIBED DOSE PER FRACTION: 1.8 GY
RAD ONC ARIA PLAN PRIMARY REFERENCE POINT: NORMAL
RAD ONC ARIA PLAN TOTAL FRACTIONS PRESCRIBED: 25
RAD ONC ARIA PLAN TOTAL PRESCRIBED DOSE: 4500 CGY
RAD ONC ARIA REFERENCE POINT DOSAGE GIVEN TO DATE: 16.2 GY
RAD ONC ARIA REFERENCE POINT ID: NORMAL
RAD ONC ARIA REFERENCE POINT SESSION DOSAGE GIVEN: 1.8 GY

## 2022-07-18 PROCEDURE — 77385: CPT | Performed by: RADIOLOGY

## 2022-07-18 PROCEDURE — 77014 CHG CT GUIDANCE RADIATION THERAPY FLDS PLACEMENT: CPT | Performed by: RADIOLOGY

## 2022-07-19 ENCOUNTER — RADIATION ONCOLOGY WEEKLY ASSESSMENT (OUTPATIENT)
Dept: RADIATION ONCOLOGY | Facility: HOSPITAL | Age: 67
End: 2022-07-19

## 2022-07-19 VITALS
DIASTOLIC BLOOD PRESSURE: 77 MMHG | BODY MASS INDEX: 22.56 KG/M2 | WEIGHT: 166.4 LBS | HEART RATE: 68 BPM | TEMPERATURE: 97.7 F | SYSTOLIC BLOOD PRESSURE: 155 MMHG | RESPIRATION RATE: 18 BRPM | OXYGEN SATURATION: 95 %

## 2022-07-19 DIAGNOSIS — C61 PROSTATE CANCER: Primary | ICD-10-CM

## 2022-07-19 LAB
RAD ONC ARIA COURSE ID: NORMAL
RAD ONC ARIA COURSE INTENT: NORMAL
RAD ONC ARIA COURSE LAST TREATMENT DATE: NORMAL
RAD ONC ARIA COURSE START DATE: NORMAL
RAD ONC ARIA COURSE TREATMENT ELAPSED DAYS: 13
RAD ONC ARIA FIRST TREATMENT DATE: NORMAL
RAD ONC ARIA PLAN FRACTIONS TREATED TO DATE: 10
RAD ONC ARIA PLAN ID: NORMAL
RAD ONC ARIA PLAN PRESCRIBED DOSE PER FRACTION: 1.8 GY
RAD ONC ARIA PLAN PRIMARY REFERENCE POINT: NORMAL
RAD ONC ARIA PLAN TOTAL FRACTIONS PRESCRIBED: 25
RAD ONC ARIA PLAN TOTAL PRESCRIBED DOSE: 4500 CGY
RAD ONC ARIA REFERENCE POINT DOSAGE GIVEN TO DATE: 18 GY
RAD ONC ARIA REFERENCE POINT ID: NORMAL
RAD ONC ARIA REFERENCE POINT SESSION DOSAGE GIVEN: 1.8 GY

## 2022-07-19 PROCEDURE — 77014 CHG CT GUIDANCE RADIATION THERAPY FLDS PLACEMENT: CPT | Performed by: RADIOLOGY

## 2022-07-19 PROCEDURE — 77385: CPT | Performed by: RADIOLOGY

## 2022-07-19 NOTE — PROGRESS NOTES
On Treatment Visit Note    Patient Name: David Frias  : 1955   MRN: [id number]   Prostate Cancer  Radiation Treatments     Active   Plans   Pros+LN+SV   Most recent treatment: Dose planned: 180 cGy (fraction 10 on 2022)   Total: Dose planned: 4,500 cGy (25 fractions)   Elapsed Days: 13      Reference Points   PTV_45   Most recent treatment: Dose given: 180 cGy (on 2022)   Total: Dose given: 1,800 cGy   Elapsed Days: 13                 Subjective   Review of Systems: Review of Systems  Medications:  Current Outpatient Medications:   •  bicalutamide (CASODEX) 50 MG chemo tablet, Take 1 tablet by mouth Daily., Disp: 90 tablet, Rfl: 0  •  cholecalciferol (VITAMIN D3) 1000 units tablet, Take 1,000 Units by mouth Daily., Disp: , Rfl:   •  ciprofloxacin (Cipro) 500 MG tablet, Take 1 tablet by mouth 2 (Two) Times a Day., Disp: 6 tablet, Rfl: 0  •  finasteride (PROSCAR) 5 MG tablet, Take 1 tablet by mouth Daily., Disp: 30 tablet, Rfl: 5  •  HYDROcodone-acetaminophen (NORCO)  MG per tablet, Take 1 tablet by mouth Every 4 (Four) Hours As Needed for Moderate Pain., Disp: 12 tablet, Rfl: 0  •  Lecithin-I-B6-Cider Vinegar (Lecithin-Kelp-B6-Cider Vinegar) 200-0.075-5-50 MG capsule, Take 1 capsule by mouth Daily., Disp: , Rfl:   •  lisinopril (PRINIVIL,ZESTRIL) 20 MG tablet, Take 20 mg by mouth Daily., Disp: , Rfl: 2  •  Omega-3 Fatty Acids (fish oil) 1000 MG capsule capsule, Take 1,000 mg by mouth Daily With Breakfast., Disp: , Rfl:   •  Specialty Vitamins Products (JOINT/BONE VITALITY PO), Take 1 tablet by mouth Daily., Disp: , Rfl:   •  tamsulosin (Flomax) 0.4 MG capsule 24 hr capsule, Take 1 capsule by mouth Every Night., Disp: 30 capsule, Rfl: 5  Allergies: No Known Allergies  Objective   Vital Signs: There were no vitals filed for this visit.  There is no height or weight on file to calculate BMI.   Current Total XRT Dose (cGY):   Radiation Treatments     Active   Plans   Pros+LN+SV   Most recent  treatment: Dose planned: 180 cGy (fraction 10 on 7/19/2022)   Total: Dose planned: 4,500 cGy (25 fractions)   Elapsed Days: 13      Reference Points   PTV_45   Most recent treatment: Dose given: 180 cGy (on 7/19/2022)   Total: Dose given: 1,800 cGy   Elapsed Days: 13               Plan: Patient was seen today for an on treatment visit. Patient is receiving radiation therapy to the prostate cancer, SV & PLN. Patient is stable and tolerating radiation therapy well with minimal side effects. Continue with radiation therapy. I have reviewed treatment setup notes, checked and approved the daily guidance images. I reviewed dose delivery, treatment parameters and deemed them appropriate. We plan to continue radiation therapy as prescribed.

## 2022-07-20 PROCEDURE — 77427 RADIATION TX MANAGEMENT X5: CPT | Performed by: RADIOLOGY

## 2022-07-20 PROCEDURE — 77336 RADIATION PHYSICS CONSULT: CPT | Performed by: RADIOLOGY

## 2022-07-20 PROCEDURE — 77385: CPT | Performed by: RADIOLOGY

## 2022-07-20 PROCEDURE — 77014 CHG CT GUIDANCE RADIATION THERAPY FLDS PLACEMENT: CPT | Performed by: RADIOLOGY

## 2022-07-21 PROCEDURE — 77014 CHG CT GUIDANCE RADIATION THERAPY FLDS PLACEMENT: CPT | Performed by: RADIOLOGY

## 2022-07-21 PROCEDURE — 77385: CPT | Performed by: RADIOLOGY

## 2022-07-22 PROCEDURE — 77014 CHG CT GUIDANCE RADIATION THERAPY FLDS PLACEMENT: CPT | Performed by: RADIOLOGY

## 2022-07-22 PROCEDURE — 77385: CPT | Performed by: RADIOLOGY

## 2022-07-25 PROCEDURE — 77014 CHG CT GUIDANCE RADIATION THERAPY FLDS PLACEMENT: CPT | Performed by: RADIOLOGY

## 2022-07-25 PROCEDURE — 77385: CPT | Performed by: RADIOLOGY

## 2022-07-26 ENCOUNTER — RADIATION ONCOLOGY WEEKLY ASSESSMENT (OUTPATIENT)
Dept: RADIATION ONCOLOGY | Facility: HOSPITAL | Age: 67
End: 2022-07-26

## 2022-07-26 VITALS
OXYGEN SATURATION: 96 % | TEMPERATURE: 97.5 F | RESPIRATION RATE: 18 BRPM | BODY MASS INDEX: 22.37 KG/M2 | HEART RATE: 67 BPM | SYSTOLIC BLOOD PRESSURE: 128 MMHG | DIASTOLIC BLOOD PRESSURE: 80 MMHG | WEIGHT: 165 LBS

## 2022-07-26 DIAGNOSIS — C61 PROSTATE CANCER: Primary | ICD-10-CM

## 2022-07-26 DIAGNOSIS — M54.50 ACUTE MIDLINE LOW BACK PAIN, UNSPECIFIED WHETHER SCIATICA PRESENT: ICD-10-CM

## 2022-07-26 PROCEDURE — 77385: CPT | Performed by: RADIOLOGY

## 2022-07-26 PROCEDURE — 77014 CHG CT GUIDANCE RADIATION THERAPY FLDS PLACEMENT: CPT | Performed by: RADIOLOGY

## 2022-07-26 NOTE — PROGRESS NOTES
On Treatment Visit Note      Patient Name: David Frias  : 1955   MRN: [id number]     Diagnosis: Prostate Cancer    This patient was seen today for an on treatment visit.     Radiation Treatments     Active   Plans   Pros+LN+SV   Most recent treatment: Dose planned: 180 cGy (fraction 15 on 2022)   Total: Dose planned: 4,500 cGy (25 fractions)   Elapsed Days: 20      Reference Points   PTV_45   Most recent treatment: Dose given: 180 cGy (on 2022)   Total: Dose given: 2,700 cGy   Elapsed Days: 20         Historical   No historical radiation treatments to show.            Subjective      Review of Systems:   Review of Systems   Gastrointestinal: Positive for diarrhea. Negative for constipation.   Genitourinary: Negative for dysuria, frequency, hematuria, nocturia and urinary incontinence.   Musculoskeletal: Positive for back pain.   Skin: Negative for color change and dry skin.   All other systems reviewed and are negative.      Medications:     Current Outpatient Medications:   •  bicalutamide (CASODEX) 50 MG chemo tablet, Take 1 tablet by mouth Daily., Disp: 90 tablet, Rfl: 0  •  cholecalciferol (VITAMIN D3) 1000 units tablet, Take 1,000 Units by mouth Daily., Disp: , Rfl:   •  ciprofloxacin (Cipro) 500 MG tablet, Take 1 tablet by mouth 2 (Two) Times a Day., Disp: 6 tablet, Rfl: 0  •  finasteride (PROSCAR) 5 MG tablet, Take 1 tablet by mouth Daily., Disp: 30 tablet, Rfl: 5  •  HYDROcodone-acetaminophen (NORCO)  MG per tablet, Take 1 tablet by mouth Every 4 (Four) Hours As Needed for Moderate Pain., Disp: 12 tablet, Rfl: 0  •  Lecithin-I-B6-Cider Vinegar (Lecithin-Kelp-B6-Cider Vinegar) 200-0.075-5-50 MG capsule, Take 1 capsule by mouth Daily., Disp: , Rfl:   •  lisinopril (PRINIVIL,ZESTRIL) 20 MG tablet, Take 20 mg by mouth Daily., Disp: , Rfl: 2  •  Omega-3 Fatty Acids (fish oil) 1000 MG capsule capsule, Take 1,000 mg by mouth Daily With Breakfast., Disp: , Rfl:   •  Specialty Vitamins  Products (JOINT/BONE VITALITY PO), Take 1 tablet by mouth Daily., Disp: , Rfl:   •  tamsulosin (Flomax) 0.4 MG capsule 24 hr capsule, Take 1 capsule by mouth Every Night., Disp: 30 capsule, Rfl: 5    Allergies:   No Known Allergies  Objective     Physical Exam:  Physical Exam  Vitals reviewed.   Constitutional:       Appearance: Normal appearance.   Cardiovascular:      Rate and Rhythm: Normal rate and regular rhythm.      Pulses: Normal pulses.      Heart sounds: Normal heart sounds.   Pulmonary:      Effort: Pulmonary effort is normal.      Breath sounds: Normal breath sounds.   Skin:     General: Skin is warm and dry.   Neurological:      General: No focal deficit present.      Mental Status: He is alert and oriented to person, place, and time. Mental status is at baseline.   Psychiatric:         Mood and Affect: Mood normal.         Behavior: Behavior normal.         Thought Content: Thought content normal.         Vital Signs:   Vitals:    07/26/22 1006   BP: 128/80   Pulse: 67   Resp: 18   Temp: 97.5 °F (36.4 °C)   TempSrc: Temporal   SpO2: 96%   PainSc:   6   PainLoc: Back     There is no height or weight on file to calculate BMI.     Current Total XRT Dose (cGY):    Radiation Treatments     Active   Plans   Pros+LN+SV   Most recent treatment: Dose planned: 180 cGy (fraction 15 on 7/26/2022)   Total: Dose planned: 4,500 cGy (25 fractions)   Elapsed Days: 20      Reference Points   PTV_45   Most recent treatment: Dose given: 180 cGy (on 7/26/2022)   Total: Dose given: 2,700 cGy   Elapsed Days: 20         Historical   No historical radiation treatments to show.            Plan      Plan:   Patient was seen today for an on treatment visit. Patient is receiving radiation therapy to the prostate. Patient is stable and tolerating radiation therapy well with minimal side effects.  No new issues today related to radiation therapy.  He does have new low back pain that mostly happens in the mornings when he gets up to  walk around.  We will refer him to PT today.  Continue with radiation therapy.     I have reviewed treatment setup notes, checked and approved the daily guidance images. I reviewed dose delivery, treatment parameters and deemed them appropriate. We plan to continue radiation therapy as prescribed.     I, Baldo Mckenzie MD, personally performed the services described in this documentation as scribed by the above named individual in my presence, and it is both accurate and complete.  7/26/2022  11:03 EDT     Electronically signed by Baldo Mckenzie MD, 07/26/22, 11:03 AM EDT.    Scribed for Dr. Baldo Mckenzie MD by ROSEMARIE Willis 7/26/2022  10:07 EDT

## 2022-07-27 PROCEDURE — 77014 CHG CT GUIDANCE RADIATION THERAPY FLDS PLACEMENT: CPT | Performed by: RADIOLOGY

## 2022-07-27 PROCEDURE — 77385: CPT | Performed by: RADIOLOGY

## 2022-07-27 PROCEDURE — 77336 RADIATION PHYSICS CONSULT: CPT | Performed by: RADIOLOGY

## 2022-07-27 PROCEDURE — 77427 RADIATION TX MANAGEMENT X5: CPT | Performed by: RADIOLOGY

## 2022-07-28 PROCEDURE — 77014 CHG CT GUIDANCE RADIATION THERAPY FLDS PLACEMENT: CPT | Performed by: RADIOLOGY

## 2022-07-28 PROCEDURE — 77385: CPT | Performed by: RADIOLOGY

## 2022-07-29 PROCEDURE — 77014 CHG CT GUIDANCE RADIATION THERAPY FLDS PLACEMENT: CPT | Performed by: RADIOLOGY

## 2022-07-29 PROCEDURE — 77385: CPT | Performed by: RADIOLOGY

## 2022-08-01 ENCOUNTER — APPOINTMENT (OUTPATIENT)
Dept: RADIATION ONCOLOGY | Facility: HOSPITAL | Age: 67
End: 2022-08-01

## 2022-08-01 LAB
RAD ONC ARIA COURSE ID: NORMAL
RAD ONC ARIA COURSE INTENT: NORMAL
RAD ONC ARIA COURSE LAST TREATMENT DATE: NORMAL
RAD ONC ARIA COURSE START DATE: NORMAL
RAD ONC ARIA COURSE TREATMENT ELAPSED DAYS: 26
RAD ONC ARIA FIRST TREATMENT DATE: NORMAL
RAD ONC ARIA PLAN FRACTIONS TREATED TO DATE: 19
RAD ONC ARIA PLAN ID: NORMAL
RAD ONC ARIA PLAN PRESCRIBED DOSE PER FRACTION: 1.8 GY
RAD ONC ARIA PLAN PRIMARY REFERENCE POINT: NORMAL
RAD ONC ARIA PLAN TOTAL FRACTIONS PRESCRIBED: 25
RAD ONC ARIA PLAN TOTAL PRESCRIBED DOSE: 4500 CGY
RAD ONC ARIA REFERENCE POINT DOSAGE GIVEN TO DATE: 34.2 GY
RAD ONC ARIA REFERENCE POINT ID: NORMAL
RAD ONC ARIA REFERENCE POINT SESSION DOSAGE GIVEN: 1.8 GY

## 2022-08-01 PROCEDURE — 77014 CHG CT GUIDANCE RADIATION THERAPY FLDS PLACEMENT: CPT | Performed by: RADIOLOGY

## 2022-08-01 PROCEDURE — 77385: CPT | Performed by: RADIOLOGY

## 2022-08-02 ENCOUNTER — RADIATION ONCOLOGY WEEKLY ASSESSMENT (OUTPATIENT)
Dept: RADIATION ONCOLOGY | Facility: HOSPITAL | Age: 67
End: 2022-08-02

## 2022-08-02 VITALS
HEART RATE: 85 BPM | DIASTOLIC BLOOD PRESSURE: 90 MMHG | WEIGHT: 165 LBS | TEMPERATURE: 97.1 F | OXYGEN SATURATION: 97 % | BODY MASS INDEX: 22.37 KG/M2 | SYSTOLIC BLOOD PRESSURE: 145 MMHG | RESPIRATION RATE: 18 BRPM

## 2022-08-02 DIAGNOSIS — C61 PROSTATE CANCER: Primary | ICD-10-CM

## 2022-08-02 PROCEDURE — 77014 CHG CT GUIDANCE RADIATION THERAPY FLDS PLACEMENT: CPT | Performed by: RADIOLOGY

## 2022-08-02 PROCEDURE — 77385: CPT | Performed by: RADIOLOGY

## 2022-08-02 PROCEDURE — 77427 RADIATION TX MANAGEMENT X5: CPT | Performed by: RADIOLOGY

## 2022-08-02 NOTE — PROGRESS NOTES
On Treatment Visit Note      Patient Name: David Frias  : 1955   MRN: [id number]     Diagnosis: Prostate Cancer     This patient was seen today for an on treatment visit.     Radiation Treatments     Active   Plans   Pros+LN+SV   Most recent treatment: Dose planned: 180 cGy (fraction 20 on 2022)   Total: Dose planned: 4,500 cGy (25 fractions)   Elapsed Days: 27      Reference Points   PTV_45   Most recent treatment: Dose given: 180 cGy (on 2022)   Total: Dose given: 3,600 cGy   Elapsed Days: 27         Historical   No historical radiation treatments to show.            Subjective      Review of Systems:   Review of Systems   Gastrointestinal: Negative for diarrhea.   Genitourinary: Negative for dysuria, frequency, nocturia and urinary incontinence.   Skin: Negative for color change.   All other systems reviewed and are negative.      Medications:     Current Outpatient Medications:   •  bicalutamide (CASODEX) 50 MG chemo tablet, Take 1 tablet by mouth Daily., Disp: 90 tablet, Rfl: 0  •  cholecalciferol (VITAMIN D3) 1000 units tablet, Take 1,000 Units by mouth Daily., Disp: , Rfl:   •  ciprofloxacin (Cipro) 500 MG tablet, Take 1 tablet by mouth 2 (Two) Times a Day., Disp: 6 tablet, Rfl: 0  •  finasteride (PROSCAR) 5 MG tablet, Take 1 tablet by mouth Daily., Disp: 30 tablet, Rfl: 5  •  HYDROcodone-acetaminophen (NORCO)  MG per tablet, Take 1 tablet by mouth Every 4 (Four) Hours As Needed for Moderate Pain., Disp: 12 tablet, Rfl: 0  •  Lecithin-I-B6-Cider Vinegar (Lecithin-Kelp-B6-Cider Vinegar) 200-0.075-5-50 MG capsule, Take 1 capsule by mouth Daily., Disp: , Rfl:   •  lisinopril (PRINIVIL,ZESTRIL) 20 MG tablet, Take 20 mg by mouth Daily., Disp: , Rfl: 2  •  Omega-3 Fatty Acids (fish oil) 1000 MG capsule capsule, Take 1,000 mg by mouth Daily With Breakfast., Disp: , Rfl:   •  Specialty Vitamins Products (JOINT/BONE VITALITY PO), Take 1 tablet by mouth Daily., Disp: , Rfl:   •  tamsulosin  (Flomax) 0.4 MG capsule 24 hr capsule, Take 1 capsule by mouth Every Night., Disp: 30 capsule, Rfl: 5    Allergies:   No Known Allergies  Objective     Physical Exam:  Physical Exam  Vitals reviewed.   Constitutional:       Appearance: Normal appearance.   Cardiovascular:      Rate and Rhythm: Normal rate and regular rhythm.      Pulses: Normal pulses.      Heart sounds: Normal heart sounds.   Pulmonary:      Effort: Pulmonary effort is normal.      Breath sounds: Normal breath sounds.   Skin:     General: Skin is warm and dry.   Neurological:      General: No focal deficit present.      Mental Status: He is alert and oriented to person, place, and time. Mental status is at baseline.   Psychiatric:         Mood and Affect: Mood normal.         Behavior: Behavior normal.         Thought Content: Thought content normal.         Vital Signs: There were no vitals filed for this visit.  There is no height or weight on file to calculate BMI.     Current Total XRT Dose (cGY):    Radiation Treatments     Active   Plans   Pros+LN+SV   Most recent treatment: Dose planned: 180 cGy (fraction 20 on 8/2/2022)   Total: Dose planned: 4,500 cGy (25 fractions)   Elapsed Days: 27      Reference Points   PTV_45   Most recent treatment: Dose given: 180 cGy (on 8/2/2022)   Total: Dose given: 3,600 cGy   Elapsed Days: 27         Historical   No historical radiation treatments to show.            Plan      Plan:   Patient was seen today for an on treatment visit. Patient is receiving radiation therapy to the prostate. Patient is stable and tolerating radiation therapy well with minimal side effects.  No new issues today.  Continue with radiation therapy.     I have reviewed treatment setup notes, checked and approved the daily guidance images. I reviewed dose delivery, treatment parameters and deemed them appropriate. We plan to continue radiation therapy as prescribed.     IBaldo MD, personally performed the services described in  this documentation as scribed by the above named individual in my presence, and it is both accurate and complete.  8/2/2022  10:18 EDT     Electronically signed by Baldo Mckenzie MD, 08/02/22, 10:18 AM EDT.    Scribed for Dr. Baldo Mckenzie MD by ROSEMARIE Willis 8/2/2022  09:58 EDT

## 2022-08-03 PROCEDURE — 77385: CPT | Performed by: RADIOLOGY

## 2022-08-03 PROCEDURE — 77336 RADIATION PHYSICS CONSULT: CPT | Performed by: RADIOLOGY

## 2022-08-03 PROCEDURE — 77014 CHG CT GUIDANCE RADIATION THERAPY FLDS PLACEMENT: CPT | Performed by: RADIOLOGY

## 2022-08-04 PROCEDURE — 77385: CPT | Performed by: RADIOLOGY

## 2022-08-04 PROCEDURE — 77014 CHG CT GUIDANCE RADIATION THERAPY FLDS PLACEMENT: CPT | Performed by: RADIOLOGY

## 2022-08-05 PROCEDURE — 77014 CHG CT GUIDANCE RADIATION THERAPY FLDS PLACEMENT: CPT | Performed by: RADIOLOGY

## 2022-08-05 PROCEDURE — 77385: CPT

## 2022-08-08 PROCEDURE — 77385: CPT | Performed by: RADIOLOGY

## 2022-08-08 PROCEDURE — 77338 DESIGN MLC DEVICE FOR IMRT: CPT | Performed by: RADIOLOGY

## 2022-08-08 PROCEDURE — 77014 CHG CT GUIDANCE RADIATION THERAPY FLDS PLACEMENT: CPT | Performed by: RADIOLOGY

## 2022-08-08 PROCEDURE — 77300 RADIATION THERAPY DOSE PLAN: CPT | Performed by: RADIOLOGY

## 2022-08-09 ENCOUNTER — RADIATION ONCOLOGY WEEKLY ASSESSMENT (OUTPATIENT)
Dept: RADIATION ONCOLOGY | Facility: HOSPITAL | Age: 67
End: 2022-08-09

## 2022-08-09 VITALS
SYSTOLIC BLOOD PRESSURE: 127 MMHG | TEMPERATURE: 97.8 F | HEART RATE: 83 BPM | DIASTOLIC BLOOD PRESSURE: 69 MMHG | OXYGEN SATURATION: 98 % | RESPIRATION RATE: 18 BRPM

## 2022-08-09 DIAGNOSIS — C61 PROSTATE CANCER: Primary | ICD-10-CM

## 2022-08-09 LAB
RAD ONC ARIA COURSE ID: NORMAL
RAD ONC ARIA COURSE INTENT: NORMAL
RAD ONC ARIA COURSE LAST TREATMENT DATE: NORMAL
RAD ONC ARIA COURSE START DATE: NORMAL
RAD ONC ARIA COURSE TREATMENT ELAPSED DAYS: 34
RAD ONC ARIA FIRST TREATMENT DATE: NORMAL
RAD ONC ARIA PLAN FRACTIONS TREATED TO DATE: 25
RAD ONC ARIA PLAN ID: NORMAL
RAD ONC ARIA PLAN PRESCRIBED DOSE PER FRACTION: 1.8 GY
RAD ONC ARIA PLAN PRIMARY REFERENCE POINT: NORMAL
RAD ONC ARIA PLAN TOTAL FRACTIONS PRESCRIBED: 25
RAD ONC ARIA PLAN TOTAL PRESCRIBED DOSE: 4500 CGY
RAD ONC ARIA REFERENCE POINT DOSAGE GIVEN TO DATE: 45 GY
RAD ONC ARIA REFERENCE POINT ID: NORMAL
RAD ONC ARIA REFERENCE POINT SESSION DOSAGE GIVEN: 1.8 GY

## 2022-08-09 PROCEDURE — 77385: CPT | Performed by: RADIOLOGY

## 2022-08-09 PROCEDURE — 77014 CHG CT GUIDANCE RADIATION THERAPY FLDS PLACEMENT: CPT | Performed by: RADIOLOGY

## 2022-08-09 NOTE — PROGRESS NOTES
On Treatment Visit Note      Patient Name: David Frias  : 1955   MRN: [id number]     Diagnosis:  No chief complaint on file.   Prostate cancer  Staging: No matching staging information was found for the patient.     This patient was seen today for an on treatment visit.     Radiation Treatments     Active   Reference Points   PTV_45   Most recent treatment: Dose given: 180 cGy (on 2022)   Total: Dose given: 4,500 cGy   Elapsed Days: 34         Historical   Plans   Pros+LN+SV   Most recent treatment: Dose planned: 180 cGy (fraction 25 on 2022)   Total: Dose planned: 4,500 cGy (25 fractions)   Elapsed Days: 34                  Subjective      Review of Systems:   Review of Systems    Medications:     Current Outpatient Medications:   •  bicalutamide (CASODEX) 50 MG chemo tablet, Take 1 tablet by mouth Daily., Disp: 90 tablet, Rfl: 0  •  cholecalciferol (VITAMIN D3) 1000 units tablet, Take 1,000 Units by mouth Daily., Disp: , Rfl:   •  ciprofloxacin (Cipro) 500 MG tablet, Take 1 tablet by mouth 2 (Two) Times a Day., Disp: 6 tablet, Rfl: 0  •  finasteride (PROSCAR) 5 MG tablet, Take 1 tablet by mouth Daily., Disp: 30 tablet, Rfl: 5  •  HYDROcodone-acetaminophen (NORCO)  MG per tablet, Take 1 tablet by mouth Every 4 (Four) Hours As Needed for Moderate Pain., Disp: 12 tablet, Rfl: 0  •  Lecithin-I-B6-Cider Vinegar (Lecithin-Kelp-B6-Cider Vinegar) 200-0.075-5-50 MG capsule, Take 1 capsule by mouth Daily., Disp: , Rfl:   •  lisinopril (PRINIVIL,ZESTRIL) 20 MG tablet, Take 20 mg by mouth Daily., Disp: , Rfl: 2  •  Omega-3 Fatty Acids (fish oil) 1000 MG capsule capsule, Take 1,000 mg by mouth Daily With Breakfast., Disp: , Rfl:   •  Specialty Vitamins Products (JOINT/BONE VITALITY PO), Take 1 tablet by mouth Daily., Disp: , Rfl:   •  tamsulosin (Flomax) 0.4 MG capsule 24 hr capsule, Take 1 capsule by mouth Every Night., Disp: 30 capsule, Rfl: 5    Allergies:   No Known Allergies  Objective      Physical Exam:  Physical Exam     Abd Soft NT ND    Vital Signs:   Vitals:    08/09/22 0952   BP: 127/69   Pulse: 83   Resp: 18   Temp: 97.8 °F (36.6 °C)   TempSrc: Temporal   SpO2: 98%   PainSc: 0-No pain     There is no height or weight on file to calculate BMI.     Current Total XRT Dose (cGY):    Radiation Treatments     Active   Reference Points   PTV_45   Most recent treatment: Dose given: 180 cGy (on 8/9/2022)   Total: Dose given: 4,500 cGy   Elapsed Days: 34         Historical   Plans   Pros+LN+SV   Most recent treatment: Dose planned: 180 cGy (fraction 25 on 8/9/2022)   Total: Dose planned: 4,500 cGy (25 fractions)   Elapsed Days: 34                  Plan      Plan:   Patient was seen today for an on treatment visit. Patient is receiving radiation therapy to the prostate. Patient is stable and tolerating radiation therapy well with minimal side effects. Continue with radiation therapy.     I have reviewed treatment setup notes, checked and approved the daily guidance images. I reviewed dose delivery, treatment parameters and deemed them appropriate. We plan to continue radiation therapy as prescribed.     I, Baldo Mckenzie MD, personally performed the services described in this documentation as scribed by the above named individual in my presence, and it is both accurate and complete.  8/9/2022  10:01 EDT     Electronically signed by Baldo Mckenzie MD, 08/09/22, 10:01 AM EDT.      Scribed for Dr. Baldo Mckenzie MD by ROSEMARIE Willis 8/9/2022  10:00 EDT

## 2022-08-10 LAB
RAD ONC ARIA COURSE ID: NORMAL
RAD ONC ARIA COURSE INTENT: NORMAL
RAD ONC ARIA COURSE LAST TREATMENT DATE: NORMAL
RAD ONC ARIA COURSE START DATE: NORMAL
RAD ONC ARIA COURSE TREATMENT ELAPSED DAYS: 35
RAD ONC ARIA FIRST TREATMENT DATE: NORMAL
RAD ONC ARIA PLAN FRACTIONS TREATED TO DATE: 1
RAD ONC ARIA PLAN ID: NORMAL
RAD ONC ARIA PLAN PRESCRIBED DOSE PER FRACTION: 1.8 GY
RAD ONC ARIA PLAN PRIMARY REFERENCE POINT: NORMAL
RAD ONC ARIA PLAN TOTAL FRACTIONS PRESCRIBED: 20
RAD ONC ARIA PLAN TOTAL PRESCRIBED DOSE: 3600 CGY
RAD ONC ARIA REFERENCE POINT DOSAGE GIVEN TO DATE: 1.8 GY
RAD ONC ARIA REFERENCE POINT ID: NORMAL
RAD ONC ARIA REFERENCE POINT SESSION DOSAGE GIVEN: 1.8 GY

## 2022-08-10 PROCEDURE — 77336 RADIATION PHYSICS CONSULT: CPT | Performed by: RADIOLOGY

## 2022-08-10 PROCEDURE — 77385: CPT | Performed by: RADIOLOGY

## 2022-08-10 PROCEDURE — 77014 CHG CT GUIDANCE RADIATION THERAPY FLDS PLACEMENT: CPT | Performed by: RADIOLOGY

## 2022-08-11 PROCEDURE — 77385: CPT | Performed by: RADIOLOGY

## 2022-08-11 PROCEDURE — 77014 CHG CT GUIDANCE RADIATION THERAPY FLDS PLACEMENT: CPT | Performed by: RADIOLOGY

## 2022-08-12 PROCEDURE — 77385: CPT | Performed by: RADIOLOGY

## 2022-08-12 PROCEDURE — 77014 CHG CT GUIDANCE RADIATION THERAPY FLDS PLACEMENT: CPT | Performed by: RADIOLOGY

## 2022-08-15 PROCEDURE — 77385: CPT | Performed by: RADIOLOGY

## 2022-08-16 ENCOUNTER — RADIATION ONCOLOGY WEEKLY ASSESSMENT (OUTPATIENT)
Dept: RADIATION ONCOLOGY | Facility: HOSPITAL | Age: 67
End: 2022-08-16

## 2022-08-16 VITALS
BODY MASS INDEX: 22.64 KG/M2 | SYSTOLIC BLOOD PRESSURE: 161 MMHG | OXYGEN SATURATION: 98 % | RESPIRATION RATE: 18 BRPM | DIASTOLIC BLOOD PRESSURE: 89 MMHG | HEART RATE: 87 BPM | WEIGHT: 167 LBS | TEMPERATURE: 97.3 F

## 2022-08-16 DIAGNOSIS — C61 PROSTATE CANCER: Primary | ICD-10-CM

## 2022-08-16 PROCEDURE — 77427 RADIATION TX MANAGEMENT X5: CPT | Performed by: RADIOLOGY

## 2022-08-16 PROCEDURE — 77385: CPT | Performed by: RADIOLOGY

## 2022-08-16 NOTE — PROGRESS NOTES
On Treatment Visit Note      Patient Name: David Frias  : 1955   MRN: [id number]     Diagnosis: Prostate Cancer     This patient was seen today for an on treatment visit.     Radiation Treatments     Active   Plans   Pros+SV   Most recent treatment: Dose planned: 180 cGy (fraction 5 on 2022)   Total: Dose planned: 3,600 cGy (20 fractions)   Elapsed Days: 41      Reference Points   PTV_45   Most recent treatment: Dose given: 180 cGy (on 2022)   Total: Dose given: 4,500 cGy   Elapsed Days: 34      PTV_81   Most recent treatment: Dose given: 180 cGy (on 2022)   Total: Dose given: 900 cGy   Elapsed Days: 41         Historical   Plans   Pros+LN+SV   Most recent treatment: Dose planned: 180 cGy (fraction 25 on 2022)   Total: Dose planned: 4,500 cGy (25 fractions)   Elapsed Days: 34                  Subjective      Review of Systems:   Review of Systems   Gastrointestinal: Negative for constipation and diarrhea.   Genitourinary: Negative for difficulty urinating, dysuria, frequency, hematuria, nocturia and urinary incontinence.   Skin: Negative for color change and dry skin.   All other systems reviewed and are negative.      Medications:     Current Outpatient Medications:   •  bicalutamide (CASODEX) 50 MG chemo tablet, Take 1 tablet by mouth Daily., Disp: 90 tablet, Rfl: 0  •  cholecalciferol (VITAMIN D3) 1000 units tablet, Take 1,000 Units by mouth Daily., Disp: , Rfl:   •  ciprofloxacin (Cipro) 500 MG tablet, Take 1 tablet by mouth 2 (Two) Times a Day., Disp: 6 tablet, Rfl: 0  •  finasteride (PROSCAR) 5 MG tablet, Take 1 tablet by mouth Daily., Disp: 30 tablet, Rfl: 5  •  HYDROcodone-acetaminophen (NORCO)  MG per tablet, Take 1 tablet by mouth Every 4 (Four) Hours As Needed for Moderate Pain., Disp: 12 tablet, Rfl: 0  •  Lecithin-I-B6-Cider Vinegar (Lecithin-Kelp-B6-Cider Vinegar) 200-0.075-5-50 MG capsule, Take 1 capsule by mouth Daily., Disp: , Rfl:   •  lisinopril  (PRINIVIL,ZESTRIL) 20 MG tablet, Take 20 mg by mouth Daily., Disp: , Rfl: 2  •  Omega-3 Fatty Acids (fish oil) 1000 MG capsule capsule, Take 1,000 mg by mouth Daily With Breakfast., Disp: , Rfl:   •  Specialty Vitamins Products (JOINT/BONE VITALITY PO), Take 1 tablet by mouth Daily., Disp: , Rfl:   •  tamsulosin (Flomax) 0.4 MG capsule 24 hr capsule, Take 1 capsule by mouth Every Night., Disp: 30 capsule, Rfl: 5    Allergies:   No Known Allergies  Objective     Physical Exam:  Physical Exam  Vitals reviewed.   Constitutional:       Appearance: Normal appearance.   Cardiovascular:      Rate and Rhythm: Normal rate and regular rhythm.      Pulses: Normal pulses.      Heart sounds: Normal heart sounds.   Pulmonary:      Effort: Pulmonary effort is normal.      Breath sounds: Normal breath sounds.   Skin:     General: Skin is warm and dry.   Neurological:      General: No focal deficit present.      Mental Status: He is alert and oriented to person, place, and time. Mental status is at baseline.   Psychiatric:         Mood and Affect: Mood normal.         Behavior: Behavior normal.         Thought Content: Thought content normal.         Vital Signs:   Vitals:    08/16/22 0958   BP: 161/89   Pulse: 87   Resp: 18   Temp: 97.3 °F (36.3 °C)   TempSrc: Temporal   SpO2: 98%   Weight: 75.8 kg (167 lb)   PainSc: 0-No pain     Body mass index is 22.64 kg/m².     Current Total XRT Dose (cGY):    Radiation Treatments     Active   Plans   Pros+SV   Most recent treatment: Dose planned: 180 cGy (fraction 5 on 8/16/2022)   Total: Dose planned: 3,600 cGy (20 fractions)   Elapsed Days: 41      Reference Points   PTV_45   Most recent treatment: Dose given: 180 cGy (on 8/9/2022)   Total: Dose given: 4,500 cGy   Elapsed Days: 34      PTV_81   Most recent treatment: Dose given: 180 cGy (on 8/16/2022)   Total: Dose given: 900 cGy   Elapsed Days: 41         Historical   Plans   Pros+LN+SV   Most recent treatment: Dose planned: 180 cGy  (fraction 25 on 8/9/2022)   Total: Dose planned: 4,500 cGy (25 fractions)   Elapsed Days: 34                  Plan      Plan:   Patient was seen today for an on treatment visit. Patient is receiving radiation therapy to the prostate. Patient is stable and tolerating radiation therapy well with minimal side effects.  No new issues today.  He is not experiencing any urinary problems.  He states that he takes an Imodium in the morning and Imodium in the evening and this controls his diarrhea symptoms.  He is having some fatigue, which I instructed was normal.  Continue with radiation therapy.     I have reviewed treatment setup notes, checked and approved the daily guidance images. I reviewed dose delivery, treatment parameters and deemed them appropriate. We plan to continue radiation therapy as prescribed.       Scribed for Dr. Ravi Garcia MD by ROSEMARIE Willis 8/16/2022  10:34 EDT     I, Ravi Garcia MD, personally performed the services described in this documentation as scribed by the above named individual in my presence, and it is both accurate and complete.  8/16/2022  13:45 EDT

## 2022-08-17 PROCEDURE — 77385: CPT | Performed by: RADIOLOGY

## 2022-08-17 PROCEDURE — 77336 RADIATION PHYSICS CONSULT: CPT | Performed by: RADIOLOGY

## 2022-08-18 PROCEDURE — 77385: CPT | Performed by: RADIOLOGY

## 2022-08-19 PROCEDURE — 77385: CPT | Performed by: RADIOLOGY

## 2022-08-22 PROCEDURE — 77385: CPT | Performed by: RADIOLOGY

## 2022-08-22 PROCEDURE — 77014 CHG CT GUIDANCE RADIATION THERAPY FLDS PLACEMENT: CPT | Performed by: RADIOLOGY

## 2022-08-23 ENCOUNTER — RADIATION ONCOLOGY WEEKLY ASSESSMENT (OUTPATIENT)
Dept: RADIATION ONCOLOGY | Facility: HOSPITAL | Age: 67
End: 2022-08-23

## 2022-08-23 VITALS
OXYGEN SATURATION: 98 % | DIASTOLIC BLOOD PRESSURE: 80 MMHG | RESPIRATION RATE: 18 BRPM | SYSTOLIC BLOOD PRESSURE: 126 MMHG | HEART RATE: 72 BPM | TEMPERATURE: 96.9 F | WEIGHT: 167 LBS | BODY MASS INDEX: 22.64 KG/M2

## 2022-08-23 DIAGNOSIS — C61 PROSTATE CANCER: Primary | ICD-10-CM

## 2022-08-23 PROCEDURE — 77427 RADIATION TX MANAGEMENT X5: CPT | Performed by: RADIOLOGY

## 2022-08-23 PROCEDURE — 77385: CPT | Performed by: RADIOLOGY

## 2022-08-23 NOTE — PROGRESS NOTES
On Treatment Visit Note      Patient Name: David Frias  : 1955   MRN: [id number]     Diagnosis:  Prostate Cancer     This patient was seen today for an on treatment visit.     Radiation Treatments     Active   Plans   Pros+SV   Most recent treatment: Dose planned: 180 cGy (fraction 10 on 2022)   Total: Dose planned: 3,600 cGy (20 fractions)   Elapsed Days: 48      Reference Points   PTV_45   Most recent treatment: Dose given: 180 cGy (on 2022)   Total: Dose given: 4,500 cGy   Elapsed Days: 34      PTV_81   Most recent treatment: Dose given: 180 cGy (on 2022)   Total: Dose given: 1,800 cGy   Elapsed Days: 48         Historical   Plans   Pros+LN+SV   Most recent treatment: Dose planned: 180 cGy (fraction 25 on 2022)   Total: Dose planned: 4,500 cGy (25 fractions)   Elapsed Days: 34                  Subjective      Review of Systems:   Review of Systems   Constitutional: Positive for fatigue.   Gastrointestinal: Negative for diarrhea.   Genitourinary: Negative for dysuria, frequency and urinary incontinence.   Skin: Negative for color change.   All other systems reviewed and are negative.      Medications:     Current Outpatient Medications:   •  bicalutamide (CASODEX) 50 MG chemo tablet, Take 1 tablet by mouth Daily., Disp: 90 tablet, Rfl: 0  •  cholecalciferol (VITAMIN D3) 1000 units tablet, Take 1,000 Units by mouth Daily., Disp: , Rfl:   •  ciprofloxacin (Cipro) 500 MG tablet, Take 1 tablet by mouth 2 (Two) Times a Day., Disp: 6 tablet, Rfl: 0  •  finasteride (PROSCAR) 5 MG tablet, Take 1 tablet by mouth Daily., Disp: 30 tablet, Rfl: 5  •  HYDROcodone-acetaminophen (NORCO)  MG per tablet, Take 1 tablet by mouth Every 4 (Four) Hours As Needed for Moderate Pain., Disp: 12 tablet, Rfl: 0  •  Lecithin-I-B6-Cider Vinegar (Lecithin-Kelp-B6-Cider Vinegar) 200-0.075-5-50 MG capsule, Take 1 capsule by mouth Daily., Disp: , Rfl:   •  lisinopril (PRINIVIL,ZESTRIL) 20 MG tablet, Take 20 mg  by mouth Daily., Disp: , Rfl: 2  •  Omega-3 Fatty Acids (fish oil) 1000 MG capsule capsule, Take 1,000 mg by mouth Daily With Breakfast., Disp: , Rfl:   •  Specialty Vitamins Products (JOINT/BONE VITALITY PO), Take 1 tablet by mouth Daily., Disp: , Rfl:   •  tamsulosin (Flomax) 0.4 MG capsule 24 hr capsule, Take 1 capsule by mouth Every Night., Disp: 30 capsule, Rfl: 5    Allergies:   No Known Allergies  Objective     Physical Exam:  Physical Exam  Vitals reviewed.   Constitutional:       Appearance: Normal appearance.   Cardiovascular:      Rate and Rhythm: Normal rate and regular rhythm.      Pulses: Normal pulses.      Heart sounds: Normal heart sounds.   Pulmonary:      Effort: Pulmonary effort is normal.      Breath sounds: Normal breath sounds.   Skin:     General: Skin is warm and dry.   Neurological:      General: No focal deficit present.      Mental Status: He is alert and oriented to person, place, and time. Mental status is at baseline.   Psychiatric:         Mood and Affect: Mood normal.         Behavior: Behavior normal.         Thought Content: Thought content normal.         Vital Signs:   Vitals:    08/23/22 0943   BP: 126/80   Pulse: 72   Resp: 18   Temp: 96.9 °F (36.1 °C)   TempSrc: Temporal   SpO2: 98%   Weight: 75.8 kg (167 lb)   PainSc: 0-No pain     Body mass index is 22.64 kg/m².     Current Total XRT Dose (cGY):    Radiation Treatments     Active   Plans   Pros+SV   Most recent treatment: Dose planned: 180 cGy (fraction 10 on 8/23/2022)   Total: Dose planned: 3,600 cGy (20 fractions)   Elapsed Days: 48      Reference Points   PTV_45   Most recent treatment: Dose given: 180 cGy (on 8/9/2022)   Total: Dose given: 4,500 cGy   Elapsed Days: 34      PTV_81   Most recent treatment: Dose given: 180 cGy (on 8/23/2022)   Total: Dose given: 1,800 cGy   Elapsed Days: 48         Historical   Plans   Pros+LN+SV   Most recent treatment: Dose planned: 180 cGy (fraction 25 on 8/9/2022)   Total: Dose  planned: 4,500 cGy (25 fractions)   Elapsed Days: 34                  Plan      Plan:   Patient was seen today for an on treatment visit. Patient is receiving radiation therapy to the prostate. Patient is stable and tolerating radiation therapy well with minimal side effects.  No new issues today.  Patient is not experiencing any urinary or bowel issues.  He is having some fatigue, which is continued, and we instructed him that this was a normal side effect and will improve with time once treatment is complete.  Continue with radiation therapy.     I have reviewed treatment setup notes, checked and approved the daily guidance images. I reviewed dose delivery, treatment parameters and deemed them appropriate. We plan to continue radiation therapy as prescribed.     Digital speech recognition software was used to dictate parts of this note, some dictation errors may occur.    I, Baldo Mckenzie MD, personally performed the services described in this documentation as scribed by the above named individual in my presence, and it is both accurate and complete.  8/23/2022  10:05 EDT     Electronically signed by Baldo Mckenzie MD, 08/23/22, 10:06 AM EDT.    Scribed for Dr. Baldo Mckenzie MD by ROSEMARIE Willis 8/23/2022  09:49 EDT

## 2022-08-24 PROCEDURE — 77385: CPT | Performed by: RADIOLOGY

## 2022-08-24 PROCEDURE — 77014 CHG CT GUIDANCE RADIATION THERAPY FLDS PLACEMENT: CPT | Performed by: RADIOLOGY

## 2022-08-24 PROCEDURE — 77336 RADIATION PHYSICS CONSULT: CPT | Performed by: RADIOLOGY

## 2022-08-25 PROCEDURE — 77014 CHG CT GUIDANCE RADIATION THERAPY FLDS PLACEMENT: CPT | Performed by: RADIOLOGY

## 2022-08-25 PROCEDURE — 77385: CPT | Performed by: RADIOLOGY

## 2022-08-26 PROCEDURE — 77385: CPT | Performed by: RADIOLOGY

## 2022-08-26 PROCEDURE — 77014 CHG CT GUIDANCE RADIATION THERAPY FLDS PLACEMENT: CPT | Performed by: RADIOLOGY

## 2022-08-29 PROCEDURE — 77014 CHG CT GUIDANCE RADIATION THERAPY FLDS PLACEMENT: CPT | Performed by: RADIOLOGY

## 2022-08-29 PROCEDURE — 77385: CPT | Performed by: RADIOLOGY

## 2022-08-30 ENCOUNTER — RADIATION ONCOLOGY WEEKLY ASSESSMENT (OUTPATIENT)
Dept: RADIATION ONCOLOGY | Facility: HOSPITAL | Age: 67
End: 2022-08-30

## 2022-08-30 VITALS
BODY MASS INDEX: 22.59 KG/M2 | RESPIRATION RATE: 18 BRPM | OXYGEN SATURATION: 96 % | DIASTOLIC BLOOD PRESSURE: 82 MMHG | HEART RATE: 84 BPM | WEIGHT: 166.6 LBS | SYSTOLIC BLOOD PRESSURE: 146 MMHG | TEMPERATURE: 98.1 F

## 2022-08-30 DIAGNOSIS — C61 PROSTATE CANCER: Primary | ICD-10-CM

## 2022-08-30 PROCEDURE — 77385: CPT | Performed by: RADIOLOGY

## 2022-08-30 PROCEDURE — 77014 CHG CT GUIDANCE RADIATION THERAPY FLDS PLACEMENT: CPT | Performed by: RADIOLOGY

## 2022-08-30 PROCEDURE — 77427 RADIATION TX MANAGEMENT X5: CPT | Performed by: RADIOLOGY

## 2022-08-30 NOTE — PROGRESS NOTES
On Treatment Visit Note      Patient Name: David Frias  : 1955   MRN: [id number]     Diagnosis:  Prostate Cancer     This patient was seen today for an on treatment visit.     Radiation Treatments     Active   Plans   Pros+SV   Most recent treatment: Dose planned: 180 cGy (fraction 15 on 2022)   Total: Dose planned: 3,600 cGy (20 fractions)   Elapsed Days: 55      Reference Points   PTV_45   Most recent treatment: Dose given: 180 cGy (on 2022)   Total: Dose given: 4,500 cGy   Elapsed Days: 34      PTV_81   Most recent treatment: Dose given: 180 cGy (on 2022)   Total: Dose given: 2,700 cGy   Elapsed Days: 55         Historical   Plans   Pros+LN+SV   Most recent treatment: Dose planned: 180 cGy (fraction 25 on 2022)   Total: Dose planned: 4,500 cGy (25 fractions)   Elapsed Days: 34                  Subjective      Review of Systems:   Review of Systems   Gastrointestinal: Negative for diarrhea.   Genitourinary: Negative for dysuria, frequency, hematuria and urinary incontinence.   Skin: Negative for color change.   All other systems reviewed and are negative.      Medications:     Current Outpatient Medications:   •  bicalutamide (CASODEX) 50 MG chemo tablet, Take 1 tablet by mouth Daily., Disp: 90 tablet, Rfl: 0  •  cholecalciferol (VITAMIN D3) 1000 units tablet, Take 1,000 Units by mouth Daily., Disp: , Rfl:   •  ciprofloxacin (Cipro) 500 MG tablet, Take 1 tablet by mouth 2 (Two) Times a Day., Disp: 6 tablet, Rfl: 0  •  finasteride (PROSCAR) 5 MG tablet, Take 1 tablet by mouth Daily., Disp: 30 tablet, Rfl: 5  •  HYDROcodone-acetaminophen (NORCO)  MG per tablet, Take 1 tablet by mouth Every 4 (Four) Hours As Needed for Moderate Pain., Disp: 12 tablet, Rfl: 0  •  Lecithin-I-B6-Cider Vinegar (Lecithin-Kelp-B6-Cider Vinegar) 200-0.075-5-50 MG capsule, Take 1 capsule by mouth Daily., Disp: , Rfl:   •  lisinopril (PRINIVIL,ZESTRIL) 20 MG tablet, Take 20 mg by mouth Daily., Disp: , Rfl:  2  •  Omega-3 Fatty Acids (fish oil) 1000 MG capsule capsule, Take 1,000 mg by mouth Daily With Breakfast., Disp: , Rfl:   •  Specialty Vitamins Products (JOINT/BONE VITALITY PO), Take 1 tablet by mouth Daily., Disp: , Rfl:   •  tamsulosin (Flomax) 0.4 MG capsule 24 hr capsule, Take 1 capsule by mouth Every Night., Disp: 30 capsule, Rfl: 5    Allergies:   No Known Allergies  Objective     Physical Exam:  Physical Exam  Vitals reviewed.   Constitutional:       Appearance: Normal appearance.   Cardiovascular:      Rate and Rhythm: Normal rate and regular rhythm.      Pulses: Normal pulses.      Heart sounds: Normal heart sounds.   Pulmonary:      Effort: Pulmonary effort is normal.      Breath sounds: Normal breath sounds.   Skin:     General: Skin is warm and dry.   Neurological:      General: No focal deficit present.      Mental Status: He is alert and oriented to person, place, and time. Mental status is at baseline.   Psychiatric:         Mood and Affect: Mood normal.         Behavior: Behavior normal.         Thought Content: Thought content normal.         Vital Signs:   Vitals:    08/30/22 0958   BP: 146/82   Pulse: 84   Resp: 18   Temp: 98.1 °F (36.7 °C)   TempSrc: Temporal   SpO2: 96%   Weight: 75.6 kg (166 lb 9.6 oz)   PainSc: 0-No pain     Body mass index is 22.59 kg/m².     Current Total XRT Dose (cGY):    Radiation Treatments     Active   Plans   Pros+SV   Most recent treatment: Dose planned: 180 cGy (fraction 15 on 8/30/2022)   Total: Dose planned: 3,600 cGy (20 fractions)   Elapsed Days: 55      Reference Points   PTV_45   Most recent treatment: Dose given: 180 cGy (on 8/9/2022)   Total: Dose given: 4,500 cGy   Elapsed Days: 34      PTV_81   Most recent treatment: Dose given: 180 cGy (on 8/30/2022)   Total: Dose given: 2,700 cGy   Elapsed Days: 55         Historical   Plans   Pros+LN+SV   Most recent treatment: Dose planned: 180 cGy (fraction 25 on 8/9/2022)   Total: Dose planned: 4,500 cGy (25  fractions)   Elapsed Days: 34                  Plan      Plan:   Patient was seen today for an on treatment visit. Patient is receiving radiation therapy to the prostate. Patient is stable and tolerating radiation therapy well with minimal side effects.  No new issues today.  Continue with radiation therapy.     I have reviewed treatment setup notes, checked and approved the daily guidance images. I reviewed dose delivery, treatment parameters and deemed them appropriate. We plan to continue radiation therapy as prescribed.     Digital speech recognition software was used to dictate parts of this note, some dictation errors may occur.    I, Baldo Mckenzie MD, personally performed the services described in this documentation as scribed by the above named individual in my presence, and it is both accurate and complete.  8/30/2022  10:20 EDT     Electronically signed by Baldo Mckenzie MD, 08/30/22, 10:20 AM EDT.    Scribed for Dr. Baldo Mckenzie MD by ROSEMARIE Willis 8/30/2022  10:02 EDT

## 2022-08-31 PROCEDURE — 77385: CPT | Performed by: RADIOLOGY

## 2022-08-31 PROCEDURE — 77014 CHG CT GUIDANCE RADIATION THERAPY FLDS PLACEMENT: CPT | Performed by: RADIOLOGY

## 2022-08-31 PROCEDURE — 77336 RADIATION PHYSICS CONSULT: CPT | Performed by: RADIOLOGY

## 2022-09-01 ENCOUNTER — OFFICE VISIT (OUTPATIENT)
Dept: RADIATION ONCOLOGY | Facility: HOSPITAL | Age: 67
End: 2022-09-01

## 2022-09-01 LAB
RAD ONC ARIA COURSE ID: NORMAL
RAD ONC ARIA COURSE INTENT: NORMAL
RAD ONC ARIA COURSE LAST TREATMENT DATE: NORMAL
RAD ONC ARIA COURSE START DATE: NORMAL
RAD ONC ARIA COURSE TREATMENT ELAPSED DAYS: 57
RAD ONC ARIA FIRST TREATMENT DATE: NORMAL
RAD ONC ARIA PLAN FRACTIONS TREATED TO DATE: 17
RAD ONC ARIA PLAN ID: NORMAL
RAD ONC ARIA PLAN PRESCRIBED DOSE PER FRACTION: 1.8 GY
RAD ONC ARIA PLAN PRIMARY REFERENCE POINT: NORMAL
RAD ONC ARIA PLAN TOTAL FRACTIONS PRESCRIBED: 20
RAD ONC ARIA PLAN TOTAL PRESCRIBED DOSE: 3600 CGY
RAD ONC ARIA REFERENCE POINT DOSAGE GIVEN TO DATE: 30.6 GY
RAD ONC ARIA REFERENCE POINT ID: NORMAL
RAD ONC ARIA REFERENCE POINT SESSION DOSAGE GIVEN: 1.8 GY

## 2022-09-01 PROCEDURE — 77014 CHG CT GUIDANCE RADIATION THERAPY FLDS PLACEMENT: CPT | Performed by: RADIOLOGY

## 2022-09-01 PROCEDURE — 77385: CPT | Performed by: RADIOLOGY

## 2022-09-02 PROCEDURE — 77014 CHG CT GUIDANCE RADIATION THERAPY FLDS PLACEMENT: CPT | Performed by: RADIOLOGY

## 2022-09-02 PROCEDURE — 77385: CPT

## 2022-09-06 ENCOUNTER — RADIATION ONCOLOGY WEEKLY ASSESSMENT (OUTPATIENT)
Dept: RADIATION ONCOLOGY | Facility: HOSPITAL | Age: 67
End: 2022-09-06

## 2022-09-06 VITALS
WEIGHT: 166 LBS | SYSTOLIC BLOOD PRESSURE: 135 MMHG | OXYGEN SATURATION: 96 % | BODY MASS INDEX: 22.51 KG/M2 | DIASTOLIC BLOOD PRESSURE: 80 MMHG | HEART RATE: 71 BPM | RESPIRATION RATE: 18 BRPM | TEMPERATURE: 97.7 F

## 2022-09-06 DIAGNOSIS — C61 PROSTATE CANCER: Primary | ICD-10-CM

## 2022-09-06 PROCEDURE — 77385: CPT | Performed by: RADIOLOGY

## 2022-09-06 PROCEDURE — 77014 CHG CT GUIDANCE RADIATION THERAPY FLDS PLACEMENT: CPT | Performed by: RADIOLOGY

## 2022-09-06 PROCEDURE — FACE2FACE: Performed by: RADIOLOGY

## 2022-09-06 NOTE — PROGRESS NOTES
On Treatment Visit Note      Patient Name: David Frias  : 1955   MRN: [id number]     Diagnosis: Prostate Cancer     This patient was seen today for an on treatment visit.     Radiation Treatments     Active   Plans   Pros+SV   Most recent treatment: Dose planned: 180 cGy (fraction 19 on 2022)   Total: Dose planned: 3,600 cGy (20 fractions)   Elapsed Days: 62      Reference Points   PTV_45   Most recent treatment: Dose given: 180 cGy (on 2022)   Total: Dose given: 4,500 cGy   Elapsed Days: 34      PTV_81   Most recent treatment: Dose given: 180 cGy (on 2022)   Total: Dose given: 3,420 cGy   Elapsed Days: 62         Historical   Plans   Pros+LN+SV   Most recent treatment: Dose planned: 180 cGy (fraction 25 on 2022)   Total: Dose planned: 4,500 cGy (25 fractions)   Elapsed Days: 34                  Subjective      Review of Systems:   Review of Systems   Gastrointestinal: Negative for diarrhea.   Genitourinary: Negative for difficulty urinating, dysuria, frequency, hematuria, nocturia and urinary incontinence.   Skin: Negative for color change.   All other systems reviewed and are negative.    Medications:     Current Outpatient Medications:   •  bicalutamide (CASODEX) 50 MG chemo tablet, Take 1 tablet by mouth Daily., Disp: 90 tablet, Rfl: 0  •  cholecalciferol (VITAMIN D3) 1000 units tablet, Take 1,000 Units by mouth Daily., Disp: , Rfl:   •  ciprofloxacin (Cipro) 500 MG tablet, Take 1 tablet by mouth 2 (Two) Times a Day., Disp: 6 tablet, Rfl: 0  •  finasteride (PROSCAR) 5 MG tablet, Take 1 tablet by mouth Daily., Disp: 30 tablet, Rfl: 5  •  HYDROcodone-acetaminophen (NORCO)  MG per tablet, Take 1 tablet by mouth Every 4 (Four) Hours As Needed for Moderate Pain., Disp: 12 tablet, Rfl: 0  •  Lecithin-I-B6-Cider Vinegar (Lecithin-Kelp-B6-Cider Vinegar) 200-0.075-5-50 MG capsule, Take 1 capsule by mouth Daily., Disp: , Rfl:   •  lisinopril (PRINIVIL,ZESTRIL) 20 MG tablet, Take 20 mg by  mouth Daily., Disp: , Rfl: 2  •  Omega-3 Fatty Acids (fish oil) 1000 MG capsule capsule, Take 1,000 mg by mouth Daily With Breakfast., Disp: , Rfl:   •  Specialty Vitamins Products (JOINT/BONE VITALITY PO), Take 1 tablet by mouth Daily., Disp: , Rfl:   •  tamsulosin (Flomax) 0.4 MG capsule 24 hr capsule, Take 1 capsule by mouth Every Night., Disp: 30 capsule, Rfl: 5    Allergies:   No Known Allergies  Objective     Physical Exam:  Physical Exam  Vitals reviewed.   Constitutional:       Appearance: Normal appearance.   Cardiovascular:      Rate and Rhythm: Normal rate and regular rhythm.      Pulses: Normal pulses.      Heart sounds: Normal heart sounds.   Pulmonary:      Effort: Pulmonary effort is normal.      Breath sounds: Normal breath sounds.   Skin:     General: Skin is warm and dry.   Neurological:      General: No focal deficit present.      Mental Status: He is alert and oriented to person, place, and time. Mental status is at baseline.   Psychiatric:         Mood and Affect: Mood normal.         Behavior: Behavior normal.         Thought Content: Thought content normal.       Vital Signs: There were no vitals filed for this visit.  There is no height or weight on file to calculate BMI.     Current Total XRT Dose (cGY):    Radiation Treatments     Active   Plans   Pros+SV   Most recent treatment: Dose planned: 180 cGy (fraction 19 on 9/6/2022)   Total: Dose planned: 3,600 cGy (20 fractions)   Elapsed Days: 62      Reference Points   PTV_45   Most recent treatment: Dose given: 180 cGy (on 8/9/2022)   Total: Dose given: 4,500 cGy   Elapsed Days: 34      PTV_81   Most recent treatment: Dose given: 180 cGy (on 9/6/2022)   Total: Dose given: 3,420 cGy   Elapsed Days: 62         Historical   Plans   Pros+LN+SV   Most recent treatment: Dose planned: 180 cGy (fraction 25 on 8/9/2022)   Total: Dose planned: 4,500 cGy (25 fractions)   Elapsed Days: 34                  Plan      Plan:   Patient was seen today for an  on treatment visit. Patient is receiving radiation therapy to the prostate. Patient is stable and tolerating radiation therapy well with minimal side effects. No issues today. Tomorrow is his last treatment. Continue with radiation therapy.     I have reviewed treatment setup notes, checked and approved the daily guidance images. I reviewed dose delivery, treatment parameters and deemed them appropriate. We plan to continue radiation therapy as prescribed.     Digital speech recognition software was used to dictate parts of this note, some dictation errors may occur.    I, Baldo Mckenzie MD, personally performed the services described in this documentation as scribed by the above named individual in my presence, and it is both accurate and complete.  9/6/2022  09:56 EDT     Electronically signed by Baldo Mckenzie MD, 09/06/22, 9:55 AM EDT.    Scribed for Dr. Baldo Mckenzie MD by ROSEMARIE Willis 9/6/2022  09:56 EDT

## 2022-09-07 PROCEDURE — 77014 CHG CT GUIDANCE RADIATION THERAPY FLDS PLACEMENT: CPT | Performed by: RADIOLOGY

## 2022-09-07 PROCEDURE — 77385: CPT | Performed by: RADIOLOGY

## 2022-10-10 ENCOUNTER — APPOINTMENT (OUTPATIENT)
Dept: RADIATION ONCOLOGY | Facility: HOSPITAL | Age: 67
End: 2022-10-10

## 2022-12-02 ENCOUNTER — TELEPHONE (OUTPATIENT)
Dept: UROLOGY | Facility: CLINIC | Age: 67
End: 2022-12-02

## 2022-12-02 NOTE — TELEPHONE ENCOUNTER
PA IS STILL APPROVED UNTIL 6/3/2023          Call ref# [id number] BRANDEN has been approved through [facility] insurance auth #[id number] approved from 6/3/2022-6/3/2023

## 2022-12-20 ENCOUNTER — OFFICE VISIT (OUTPATIENT)
Dept: UROLOGY | Facility: CLINIC | Age: 67
End: 2022-12-20

## 2022-12-20 VITALS
HEIGHT: 72 IN | BODY MASS INDEX: 20.86 KG/M2 | DIASTOLIC BLOOD PRESSURE: 90 MMHG | SYSTOLIC BLOOD PRESSURE: 143 MMHG | WEIGHT: 154 LBS | HEART RATE: 86 BPM

## 2022-12-20 DIAGNOSIS — C61 PROSTATE CANCER: Primary | ICD-10-CM

## 2022-12-20 LAB — PSA SERPL-MCNC: 0.95 NG/ML (ref 0–4)

## 2022-12-20 PROCEDURE — 99213 OFFICE O/P EST LOW 20 MIN: CPT | Performed by: UROLOGY

## 2022-12-20 PROCEDURE — 96402 CHEMO HORMON ANTINEOPL SQ/IM: CPT | Performed by: UROLOGY

## 2022-12-20 PROCEDURE — 84153 ASSAY OF PSA TOTAL: CPT | Performed by: UROLOGY

## 2022-12-20 PROCEDURE — 36415 COLL VENOUS BLD VENIPUNCTURE: CPT | Performed by: UROLOGY

## 2022-12-20 NOTE — PROGRESS NOTES
Chief Complaint:      Chief Complaint   Patient presents with   • Prostate Cancer       HPI:   67 y.o. male.  With prostate cancer status post radiation doing well here for Lupron injection.  PSA is pending  Past Medical History:     Past Medical History:   Diagnosis Date   • Elevated cholesterol    • Hypertension     but refuses to take medication as he had a drop in blood pressure once   • Prostate cancer (HCC) 12/26/2021       Current Meds:     Current Outpatient Medications   Medication Sig Dispense Refill   • bicalutamide (CASODEX) 50 MG chemo tablet Take 1 tablet by mouth Daily. 90 tablet 0   • cholecalciferol (VITAMIN D3) 1000 units tablet Take 1,000 Units by mouth Daily.     • ciprofloxacin (Cipro) 500 MG tablet Take 1 tablet by mouth 2 (Two) Times a Day. 6 tablet 0   • finasteride (PROSCAR) 5 MG tablet Take 1 tablet by mouth Daily. 30 tablet 5   • HYDROcodone-acetaminophen (NORCO)  MG per tablet Take 1 tablet by mouth Every 4 (Four) Hours As Needed for Moderate Pain. 12 tablet 0   • Lecithin-I-B6-Cider Vinegar (Lecithin-Kelp-B6-Cider Vinegar) 200-0.075-5-50 MG capsule Take 1 capsule by mouth Daily.     • lisinopril (PRINIVIL,ZESTRIL) 20 MG tablet Take 20 mg by mouth Daily.  2   • Omega-3 Fatty Acids (fish oil) 1000 MG capsule capsule Take 1,000 mg by mouth Daily With Breakfast.     • Specialty Vitamins Products (JOINT/BONE VITALITY PO) Take 1 tablet by mouth Daily.     • tamsulosin (Flomax) 0.4 MG capsule 24 hr capsule Take 1 capsule by mouth Every Night. 30 capsule 5     No current facility-administered medications for this visit.        Allergies:      No Known Allergies     Past Surgical History:     Past Surgical History:   Procedure Laterality Date   • ABDOMINAL SURGERY     • CHOLECYSTECTOMY     • CHOLECYSTECTOMY WITH INTRAOPERATIVE CHOLANGIOGRAM N/A 8/3/2016    Procedure: CHOLECYSTECTOMY LAPAROSCOPIC INTRAOPERATIVE CHOLANGIOGRAM;  Surgeon: Garrett Barnes MD;  Location: [facility];  Service:    •  CYSTOSCOPY TRANSURETHRAL RESECTION OF PROSTATE N/A 12/15/2021    Procedure: Cystoscopy and transurethral resection of the prostate;  Surgeon: Manjit Serrano MD;  Location: [facility];  Service: Urology;  Laterality: N/A;   • FRACTURE SURGERY  2015    ORIF RIGHT LEG    • HAND SURGERY Right     SECOND DIGIT AT FIRST AND SECOND JOINT SURGICAL REPAIR RELATED TO INJURY   • LEG SURGERY         Social History:     Social History     Socioeconomic History   • Marital status:    Tobacco Use   • Smoking status: Former     Packs/day: 1.00     Years: 15.00     Pack years: 15.00     Types: Cigarettes   • Smokeless tobacco: Never   • Tobacco comments:     STOPPED SMOKING 10-12 YEARS AGO   Vaping Use   • Vaping Use: Never used   Substance and Sexual Activity   • Alcohol use: No   • Drug use: No   • Sexual activity: Defer       Family History:     Family History   Problem Relation Age of Onset   • Heart disease Paternal Grandmother    • Hypertension Sister    • Cancer Sister    • Depression Brother        Review of Systems:     Review of Systems   Constitutional: Negative.    HENT: Negative.    Eyes: Negative.    Respiratory: Negative.    Cardiovascular: Negative.    Gastrointestinal: Negative.    Endocrine: Negative.    Musculoskeletal: Negative.    Allergic/Immunologic: Negative.    Neurological: Negative.    Hematological: Negative.    Psychiatric/Behavioral: Negative.        Physical Exam:     Physical Exam  Vitals and nursing note reviewed.   Constitutional:       Appearance: He is well-developed.   HENT:      Head: Normocephalic and atraumatic.   Eyes:      Conjunctiva/sclera: Conjunctivae normal.      Pupils: Pupils are equal, round, and reactive to light.   Cardiovascular:      Rate and Rhythm: Normal rate and regular rhythm.      Heart sounds: Normal heart sounds.   Pulmonary:      Effort: Pulmonary effort is normal.      Breath sounds: Normal breath sounds.   Abdominal:      General: Bowel sounds are  normal.      Palpations: Abdomen is soft.   Musculoskeletal:         General: Normal range of motion.      Cervical back: Normal range of motion.   Skin:     General: Skin is warm and dry.   Neurological:      Mental Status: He is alert and oriented to person, place, and time.      Deep Tendon Reflexes: Reflexes are normal and symmetric.   Psychiatric:         Behavior: Behavior normal.         Thought Content: Thought content normal.         Judgment: Judgment normal.         I have reviewed the following portions of the patient's history: Allergies, current medications, past family history, past medical history, past social history, past surgical history, problem list, and ROS and confirm it is accurate.    Recent Image (CT and/or KUB):      CT Abdomen and Pelvis: Results for orders placed during the hospital encounter of 01/18/22    CT Abdomen Pelvis With & Without Contrast    Narrative  EXAM: CT ABDOMEN PELVIS W WO CONTRAST-      TECHNIQUE: Multiple axial CT images were obtained from lung bases  through pubic symphysis WITH administration of IV contrast. Reformatted  images in the coronal and/or sagittal plane(s) were generated from the  axial data set to facilitate diagnostic accuracy and/or surgical  planning.  Oral Contrast:NONE.    Radiation dose reduction techniques were utilized per ALARA protocol.  Automated exposure control was initiated through either or CareDose or  DoseRight software packages by  protocol.  DOSE: 1110.10 mGy.cm    Clinical information Prostate cancer, initial staging, intermediate to  high risk; A92-Gfjcyciqi neoplasm of prostate    Comparison 12/18/2021    FINDINGS:    Lower thorax: 6.4 mm nodule in the right middle lobe stable. 8 mm nodule  in the right lower lobe stable.  4 mm nodule in the left lower lobe similar.    Abdomen:    Liver: Homogeneous. No focal hepatic mass or ductal dilatation.    Gallbladder: Surgically absent    Pancreas: Unremarkable. No mass or ductal  dilatation.    Spleen: Homogeneous. No splenomegaly.    Adrenals: No mass.    Kidneys/ureters: There are nonobstructing calcifications in both kidneys    GI tract: Non-dilated. No definite wall thickening.. There is no  evidence of appendicitis    MESENTERY: No free fluid, walled off fluid collections, mesenteric  stranding, or enlarged lymph nodes      Vasculature: There is evidence of atherosclerotic vascular disease    Abdominal wall: No focal hernia or mass.      Bladder: Diffuse thickening of the urinary bladder wall    Reproductive: Marked prostate enlargement similar to the prior study    Bones: Bridged vertebra.    Impression  1. Small parenchymal nodules in both lungs    2.Diffuse urinary bladder wall thickening    3. Prostate enlargement  4. Other findings as above          This report was finalized on 1/18/2022 10:13 AM by Dr. Keith Madden MD.       CT Stone Protocol: Results for orders placed during the hospital encounter of 12/18/21    CT Abdomen Pelvis Stone Protocol    Narrative  CT ABDOMEN AND PELVIS, NONCONTRAST, 12/18/2021    HISTORY:  66-year-old male in the ED with difficulty urinating, hematuria.    TECHNIQUE:  CT imaging of the abdomen and pelvis ordered without oral or IV contrast. Radiation dose reduction techniques included automated exposure control. Radiation audit for CT and nuclear cardiology exams in the last 12 months: 1.    COMPARISON:  *  CT stone study, 11/29/2021.    RENAL/URINARY FINDINGS:  The urinary bladder is markedly distended with estimated volume at 1130 cc. There is moderately severe bilateral hydronephrosis likely due to bladder outflow obstruction.    Markedly enlarged prostate measuring 7.6 x 6.9 x 6.8 cm (prostate volume 187 cc).    No visible nephrolithiasis or renal mass.    ABDOMEN FINDINGS:  Cholecystectomy. Liver, pancreas and spleen are within normal limits.    Small bowel and colon are normal in caliber and appearance. No gastric distention or distal esophageal  dilatation. Normal caliber abdominal aorta. Normal appendix.    PELVIS FINDINGS:  Soft tissue edema within the low midline pelvis likely related to bladder outflow obstruction. Rectum is unremarkable. No inguinal hernia.    Lung base images show no active disease. Chronic right basilar scarring.    Impression  1.  Severe bladder outlet obstruction with marked distention of the urinary bladder and moderately severe bilateral hydronephrosis. This should be associated with marked prostate enlargement as detailed above. Bladder volume 1130 cc. Prostate volume 187  cc.  2.  Cholecystectomy.    Signer Name: Ritchie Carl MD  Signed: 12/18/2021 5:33 PM  Workstation Name: [email or web address]  [facility]       KUB: Results for orders placed during the hospital encounter of 01/03/22    XR Abdomen KUB    Narrative  X-RAY ABDOMEN KUB    REASON FOR EXAM:  Flank pain; R10.9-Unspecified abdominal pain.    COMPARISON: Stone protocol CT dated 12/18/2021.    There are clips in the right upper quadrant consistent with previous  cholecystectomy. There are flowing vertebral endplate osteophytes  throughout the lumbar region. The bowel gas pattern shows no evidence of  obstruction. There are some calcifications seen overlying the collecting  systems of both kidneys, which would be consistent with stones. None  were identified along the course of the ureters or overlying the urinary  bladder. A catheter is noted overlying the urinary bladder.    Impression  Faint calcifications are noted overlying the collecting  systems of both kidneys consistent with nephrolithiasis.    This report was finalized on 1/3/2022 2:58 PM by Dr. Lenny Gabriel II, MD.       Labs (past 3 months):      No visits with results within 3 Month(s) from this visit.   Latest known visit with results is:   Appointment on 09/07/2022   Component Date Value Ref Range Status   • Course ID 09/07/2022 C1   Final   • Course Intent 09/07/2022 Curative    Final   • Course Start Date 09/07/2022 3/14/2022  8:27 AM   Final   • Course First Treatment Date 09/07/2022 7/6/2022  1:22 PM   Final   • Course Last Treatment Date 09/07/2022 9/7/2022  9:44 AM   Final   • Course Elapsed Days 09/07/2022 63   Final   • Reference Point ID 09/07/2022 [id number]   Final   • Reference Point Dosage Given to Da* 09/07/2022 36  Gy Final   • Reference Point Session Dosage Giv* 09/07/2022 1.8  Gy Final   • Plan ID 09/07/2022 Pros+SV   Final   • Plan Fractions Treated to Date 09/07/2022 20   Final   • Plan Total Fractions Prescribed 09/07/2022 20   Final   • Plan Prescribed Dose Per Fraction 09/07/2022 1.8  Gy Final   • Plan Total Prescribed Dose 09/07/2022 3,600  cGy Final   • Plan Primary Reference Point 09/07/2022 [id number]   Final        Procedure:       Assessment/Plan:   Prostate cancer:  He returns today status post biopsy for extensive discussion of prostate cancer.  We discussed staging and grading of the disease.  I described with a Romulo system being from a 2 to10 scale with the most common low-grade pattern being a Romulo 6.  I discussed the staging workup including a total body bone scan and CT scan especially with a PSA greater than 10.  We discussed the various options at length. I discussed a radical retropubic prostatectomy done in the traditional fashion and using the robotic technique.  I then discussed radiation treatment both seed therapy and external beam therapy using Gold fiduciary markers.  We talked about some of the other alternatives such as a cryosurgical ablation at high intensity focused ultrasound as being viable alternatives but not recommended at this time.  He focused on aggressive watchful waiting and explained that currently low literature it's been discovered that a lot of men can actually observe it especially with numerous other comorbidities cannot have problems from the cancer by itself.  Talked about hormonal ablation and the side effects of creation of  insulin resistance.  Overall, the patient was given appropriate literature, is going to think about it, and I'm going to revisit the topic with them after the next office visit.  Status post radiotherapy was given 45 mg of Lupron Depo without complication              This document has been electronically signed by LISA GREY MD December 20, 2022 09:49 EST    Dictated Utilizing Dragon Dictation: Part of this note may be an electronic transcription/translation of spoken language to printed text using the Dragon Dictation System.

## 2022-12-30 ENCOUNTER — TELEPHONE (OUTPATIENT)
Dept: UROLOGY | Facility: CLINIC | Age: 67
End: 2022-12-30

## 2023-06-07 ENCOUNTER — TELEPHONE (OUTPATIENT)
Dept: UROLOGY | Facility: CLINIC | Age: 68
End: 2023-06-07
Payer: MEDICARE

## 2023-06-07 NOTE — TELEPHONE ENCOUNTER
Spoke with [facility] they approved lupron from 6/7/23-6/7/2024 auth number is [id number]    [phone]

## 2023-09-19 ENCOUNTER — OFFICE VISIT (OUTPATIENT)
Dept: UROLOGY | Facility: CLINIC | Age: 68
End: 2023-09-19
Payer: MEDICARE

## 2023-09-19 VITALS
BODY MASS INDEX: 21.29 KG/M2 | DIASTOLIC BLOOD PRESSURE: 99 MMHG | HEART RATE: 91 BPM | HEIGHT: 72 IN | SYSTOLIC BLOOD PRESSURE: 177 MMHG | WEIGHT: 157.2 LBS

## 2023-09-19 DIAGNOSIS — R79.89 LOW TESTOSTERONE: Primary | ICD-10-CM

## 2023-09-19 DIAGNOSIS — N42.9 DISORDER OF PROSTATE: ICD-10-CM

## 2023-09-19 DIAGNOSIS — C61 PROSTATE CANCER: ICD-10-CM

## 2023-09-19 LAB
DEPRECATED RDW RBC AUTO: 45.6 FL (ref 37–54)
ERYTHROCYTE [DISTWIDTH] IN BLOOD BY AUTOMATED COUNT: 12.4 % (ref 12.3–15.4)
ESTRADIOL SERPL HS-MCNC: 21.7 PG/ML
HCT VFR BLD AUTO: 46.3 % (ref 37.5–51)
HGB BLD-MCNC: 16.5 G/DL (ref 13–17.7)
MCH RBC QN AUTO: 35.8 PG (ref 26.6–33)
MCHC RBC AUTO-ENTMCNC: 35.6 G/DL (ref 31.5–35.7)
MCV RBC AUTO: 100.4 FL (ref 79–97)
PLATELET # BLD AUTO: 329 10*3/MM3 (ref 140–450)
PMV BLD AUTO: 10.7 FL (ref 6–12)
PSA SERPL-MCNC: 0.84 NG/ML (ref 0–4)
RBC # BLD AUTO: 4.61 10*6/MM3 (ref 4.14–5.8)
TESTOST SERPL-MCNC: 633 NG/DL (ref 193–740)
WBC NRBC COR # BLD: 7.93 10*3/MM3 (ref 3.4–10.8)

## 2023-09-19 PROCEDURE — 36415 COLL VENOUS BLD VENIPUNCTURE: CPT | Performed by: UROLOGY

## 2023-09-19 PROCEDURE — 1160F RVW MEDS BY RX/DR IN RCRD: CPT | Performed by: UROLOGY

## 2023-09-19 PROCEDURE — 82670 ASSAY OF TOTAL ESTRADIOL: CPT | Performed by: UROLOGY

## 2023-09-19 PROCEDURE — 84153 ASSAY OF PSA TOTAL: CPT | Performed by: UROLOGY

## 2023-09-19 PROCEDURE — 85027 COMPLETE CBC AUTOMATED: CPT | Performed by: UROLOGY

## 2023-09-19 PROCEDURE — 84403 ASSAY OF TOTAL TESTOSTERONE: CPT | Performed by: UROLOGY

## 2023-09-19 PROCEDURE — 99213 OFFICE O/P EST LOW 20 MIN: CPT | Performed by: UROLOGY

## 2023-09-19 PROCEDURE — 1159F MED LIST DOCD IN RCRD: CPT | Performed by: UROLOGY

## 2023-09-19 NOTE — PROGRESS NOTES
Chief Complaint:      Chief Complaint   Patient presents with    Prostate Cancer     3 Month Follow Up       HPI:   68 y.o. male returns today as a 3-month follow-up of prostate cancer his last PSA that I have was June 2 is 0.2 when he has had diarrhea for 3 months he is lost 10 pounds no low back pain no voiding dysfunction.  PSA on June 2023 was 0.2 overall doing great other than diarrhea I will make a GI referral and I will follow-up with him in 6 months    Past Medical History:     Past Medical History:   Diagnosis Date    Elevated cholesterol     Hypertension     but refuses to take medication as he had a drop in blood pressure once    Prostate cancer 12/26/2021       Current Meds:     Current Outpatient Medications   Medication Sig Dispense Refill    bicalutamide (CASODEX) 50 MG chemo tablet Take 1 tablet by mouth Daily. 90 tablet 0    cholecalciferol (VITAMIN D3) 1000 units tablet Take 1 tablet by mouth Daily.      ciprofloxacin (Cipro) 500 MG tablet Take 1 tablet by mouth 2 (Two) Times a Day. 6 tablet 0    finasteride (PROSCAR) 5 MG tablet Take 1 tablet by mouth Daily. 30 tablet 5    HYDROcodone-acetaminophen (NORCO)  MG per tablet Take 1 tablet by mouth Every 4 (Four) Hours As Needed for Moderate Pain. 12 tablet 0    Lecithin-I-B6-Cider Vinegar (Lecithin-Kelp-B6-Cider Vinegar) 200-0.075-5-50 MG capsule Take 1 capsule by mouth Daily.      lisinopril (PRINIVIL,ZESTRIL) 20 MG tablet Take 1 tablet by mouth Daily.  2    Omega-3 Fatty Acids (fish oil) 1000 MG capsule capsule Take 1 capsule by mouth Daily With Breakfast.      Specialty Vitamins Products (JOINT/BONE VITALITY PO) Take 1 tablet by mouth Daily.      tamsulosin (Flomax) 0.4 MG capsule 24 hr capsule Take 1 capsule by mouth Every Night. 30 capsule 5     No current facility-administered medications for this visit.        Allergies:      No Known Allergies     Past Surgical History:     Past Surgical History:   Procedure Laterality Date    ABDOMINAL  SURGERY      CHOLECYSTECTOMY      CHOLECYSTECTOMY WITH INTRAOPERATIVE CHOLANGIOGRAM N/A 8/3/2016    Procedure: CHOLECYSTECTOMY LAPAROSCOPIC INTRAOPERATIVE CHOLANGIOGRAM;  Surgeon: Garrett Barnes MD;  Location: [facility] OR;  Service:     CYSTOSCOPY TRANSURETHRAL RESECTION OF PROSTATE N/A 12/15/2021    Procedure: Cystoscopy and transurethral resection of the prostate;  Surgeon: Manjit Serrano MD;  Location: [facility] OR;  Service: Urology;  Laterality: N/A;    FRACTURE SURGERY  2015    ORIF RIGHT LEG     HAND SURGERY Right     SECOND DIGIT AT FIRST AND SECOND JOINT SURGICAL REPAIR RELATED TO INJURY    LEG SURGERY         Social History:     Social History     Socioeconomic History    Marital status:    Tobacco Use    Smoking status: Former     Packs/day: 1.00     Years: 15.00     Pack years: 15.00     Types: Cigarettes    Smokeless tobacco: Never    Tobacco comments:     STOPPED SMOKING 10-12 YEARS AGO   Vaping Use    Vaping Use: Never used   Substance and Sexual Activity    Alcohol use: No    Drug use: No    Sexual activity: Defer       Family History:     Family History   Problem Relation Age of Onset    Heart disease Paternal Grandmother     Hypertension Sister     Cancer Sister     Depression Brother        Review of Systems:     Review of Systems   Constitutional:  Positive for fatigue. Negative for chills and fever.   HENT: Negative.     Eyes: Negative.    Respiratory: Negative.  Negative for cough, shortness of breath and wheezing.    Cardiovascular: Negative.  Negative for leg swelling.   Gastrointestinal:  Positive for diarrhea. Negative for abdominal pain, nausea and vomiting.   Endocrine: Negative.    Genitourinary:  Negative for difficulty urinating, dysuria, frequency, hematuria, penile swelling, scrotal swelling, testicular pain and urgency.   Musculoskeletal: Negative.  Negative for back pain and joint swelling.   Skin: Negative.    Allergic/Immunologic: Negative.    Neurological: Negative.   Negative for dizziness and headaches.   Hematological: Negative.    Psychiatric/Behavioral: Negative.  Negative for confusion.      Physical Exam:     Physical Exam  Vitals and nursing note reviewed.   Constitutional:       Appearance: He is well-developed.   HENT:      Head: Normocephalic and atraumatic.   Eyes:      Conjunctiva/sclera: Conjunctivae normal.      Pupils: Pupils are equal, round, and reactive to light.   Cardiovascular:      Rate and Rhythm: Normal rate and regular rhythm.      Heart sounds: Normal heart sounds.   Pulmonary:      Effort: Pulmonary effort is normal.      Breath sounds: Normal breath sounds.   Abdominal:      General: Bowel sounds are normal.      Palpations: Abdomen is soft.   Musculoskeletal:         General: Normal range of motion.      Cervical back: Normal range of motion.   Skin:     General: Skin is warm and dry.   Neurological:      Mental Status: He is alert and oriented to person, place, and time.      Deep Tendon Reflexes: Reflexes are normal and symmetric.   Psychiatric:         Behavior: Behavior normal.         Thought Content: Thought content normal.         Judgment: Judgment normal.       I have reviewed the following portions of the patient's history: Allergies, current medications, past family history, past medical history, past social history, past surgical history, problem list, and ROS and confirm it is accurate.    Recent Image (CT and/or KUB):      CT Abdomen and Pelvis: Results for orders placed during the hospital encounter of 01/18/22    CT Abdomen Pelvis With & Without Contrast    Narrative  EXAM: CT ABDOMEN PELVIS W WO CONTRAST-      TECHNIQUE: Multiple axial CT images were obtained from lung bases  through pubic symphysis WITH administration of IV contrast. Reformatted  images in the coronal and/or sagittal plane(s) were generated from the  axial data set to facilitate diagnostic accuracy and/or surgical  planning.  Oral Contrast:NONE.    Radiation dose  reduction techniques were utilized per ALARA protocol.  Automated exposure control was initiated through either or CareDose or  DoseRight software packages by  protocol.  DOSE: 1110.10 mGy.cm    Clinical information Prostate cancer, initial staging, intermediate to  high risk; [id number] neoplasm of prostate    Comparison 12/18/2021    FINDINGS:    Lower thorax: 6.4 mm nodule in the right middle lobe stable. 8 mm nodule  in the right lower lobe stable.  4 mm nodule in the left lower lobe similar.    Abdomen:    Liver: Homogeneous. No focal hepatic mass or ductal dilatation.    Gallbladder: Surgically absent    Pancreas: Unremarkable. No mass or ductal dilatation.    Spleen: Homogeneous. No splenomegaly.    Adrenals: No mass.    Kidneys/ureters: There are nonobstructing calcifications in both kidneys    GI tract: Non-dilated. No definite wall thickening.. There is no  evidence of appendicitis    MESENTERY: No free fluid, walled off fluid collections, mesenteric  stranding, or enlarged lymph nodes      Vasculature: There is evidence of atherosclerotic vascular disease    Abdominal wall: No focal hernia or mass.      Bladder: Diffuse thickening of the urinary bladder wall    Reproductive: Marked prostate enlargement similar to the prior study    Bones: Bridged vertebra.    Impression  1. Small parenchymal nodules in both lungs    2.Diffuse urinary bladder wall thickening    3. Prostate enlargement  4. Other findings as above          This report was finalized on 1/18/2022 10:13 AM by Dr. Keith Madden MD.       CT Stone Protocol: Results for orders placed during the hospital encounter of 12/18/21    CT Abdomen Pelvis Stone Protocol    Narrative  CT ABDOMEN AND PELVIS, NONCONTRAST, 12/18/2021    HISTORY:  66-year-old male in the ED with difficulty urinating, hematuria.    TECHNIQUE:  CT imaging of the abdomen and pelvis ordered without oral or IV contrast. Radiation dose reduction techniques included  automated exposure control. Radiation audit for CT and nuclear cardiology exams in the last 12 months: 1.    COMPARISON:  *  CT stone study, 11/29/2021.    RENAL/URINARY FINDINGS:  The urinary bladder is markedly distended with estimated volume at 1130 cc. There is moderately severe bilateral hydronephrosis likely due to bladder outflow obstruction.    Markedly enlarged prostate measuring 7.6 x 6.9 x 6.8 cm (prostate volume 187 cc).    No visible nephrolithiasis or renal mass.    ABDOMEN FINDINGS:  Cholecystectomy. Liver, pancreas and spleen are within normal limits.    Small bowel and colon are normal in caliber and appearance. No gastric distention or distal esophageal dilatation. Normal caliber abdominal aorta. Normal appendix.    PELVIS FINDINGS:  Soft tissue edema within the low midline pelvis likely related to bladder outflow obstruction. Rectum is unremarkable. No inguinal hernia.    Lung base images show no active disease. Chronic right basilar scarring.    Impression  1.  Severe bladder outlet obstruction with marked distention of the urinary bladder and moderately severe bilateral hydronephrosis. This should be associated with marked prostate enlargement as detailed above. Bladder volume 1130 cc. Prostate volume 187  cc.  2.  Cholecystectomy.    Signer Name: Ritchie Carl MD  Signed: 12/18/2021 5:33 PM  Workstation Name: [email or web address]  [facility]       KUB: Results for orders placed during the hospital encounter of 01/03/22    XR Abdomen KUB    Narrative  X-RAY ABDOMEN KUB    REASON FOR EXAM:  Flank pain; R10.9-Unspecified abdominal pain.    COMPARISON: Stone protocol CT dated 12/18/2021.    There are clips in the right upper quadrant consistent with previous  cholecystectomy. There are flowing vertebral endplate osteophytes  throughout the lumbar region. The bowel gas pattern shows no evidence of  obstruction. There are some calcifications seen overlying the  collecting  systems of both kidneys, which would be consistent with stones. None  were identified along the course of the ureters or overlying the urinary  bladder. A catheter is noted overlying the urinary bladder.    Impression  Faint calcifications are noted overlying the collecting  systems of both kidneys consistent with nephrolithiasis.    This report was finalized on 1/3/2022 2:58 PM by Dr. Lenny Gabriel II, MD.       Labs (past 3 months):      Office Visit on 06/20/2023   Component Date Value Ref Range Status    PSA 06/20/2023 0.212  0.000 - 4.000 ng/mL Final        Procedure:       Assessment/Plan:   Prostate cancer:  He returns today status post biopsy for extensive discussion of prostate cancer.  We discussed staging and grading of the disease.  I described with a Romulo system being from a 2 to10 scale with the most common low-grade pattern being a Romulo 6.  I discussed the staging workup including a total body bone scan and CT scan especially with a PSA greater than 10.  We discussed the various options at length. I discussed a radical retropubic prostatectomy done in the traditional fashion and using the robotic technique.  I then discussed radiation treatment both seed therapy and external beam therapy using Gold fiduciary markers.  We talked about some of the other alternatives such as a cryosurgical ablation at high intensity focused ultrasound as being viable alternatives but not recommended at this time.  He focused on aggressive watchful waiting and explained that currently low literature it's been discovered that a lot of men can actually observe it especially with numerous other comorbidities cannot have problems from the cancer by itself.  Talked about hormonal ablation and the side effects of creation of insulin resistance.  Overall, the patient was given appropriate literature, is going to think about it, and I'm going to revisit the topic with them after the next office visit.  Remains  in remission  -GI referral rule out radiation proctitis      This document has been electronically signed by LISA GREY MD September 19, 2023 09:57 EDT    Dictated Utilizing Dragon Dictation: Part of this note may be an electronic transcription/translation of spoken language to printed text using the Dragon Dictation System.

## 2024-01-03 ENCOUNTER — TRANSCRIBE ORDERS (OUTPATIENT)
Dept: ADMINISTRATIVE | Facility: HOSPITAL | Age: 69
End: 2024-01-03
Payer: MEDICARE

## 2024-01-03 DIAGNOSIS — R51.9 FACIAL PAIN: Primary | ICD-10-CM

## (undated) DEVICE — URINE DRAINAGE BAG,LUER-LOCK SAMPLING, ANTI-REFLUX DEVICE, DRAIN PORT: Brand: DOVER

## (undated) DEVICE — EVAC BLDR ELLIK 1P/U

## (undated) DEVICE — HF-RESECTION ELECTRODE PLASMABUTTON BUTTON, 24 FR., 12°-30°, ESG TURIS: Brand: OLYMPUS

## (undated) DEVICE — GOWN,REINF,POLY,ECL,PP SLV,XXL: Brand: MEDLINE

## (undated) DEVICE — SYRINGE,TOOMEY,IRRIGATION,70CC,STERILE: Brand: MEDLINE

## (undated) DEVICE — GLV SURG PREMIERPRO MIC LTX PF SZ8 BRN

## (undated) DEVICE — TUBING, SUCTION, 3/16" X 10', STRAIGHT: Brand: MEDLINE

## (undated) DEVICE — CATH FOL BARDEX IC 3WY 22F 30CC

## (undated) DEVICE — COR CYSTO: Brand: MEDLINE INDUSTRIES, INC.

## (undated) DEVICE — Y-TYPE TUR/BLADDER IRRIGATION SET, REGULATING CLAMP

## (undated) DEVICE — HF-RESECTION ELECTRODE PLASMALOOP LOOP, MEDIUM, 24 FR., 12°-30°, ESG TURIS: Brand: OLYMPUS

## (undated) DEVICE — DRAINBAG,ANTI-REFLUX TOWER,L/F,2000ML,LL: Brand: MEDLINE

## (undated) DEVICE — EVAC BLDR UROVAC W ADAPT